# Patient Record
Sex: MALE | Race: WHITE
[De-identification: names, ages, dates, MRNs, and addresses within clinical notes are randomized per-mention and may not be internally consistent; named-entity substitution may affect disease eponyms.]

---

## 2018-07-28 ENCOUNTER — HOSPITAL ENCOUNTER (EMERGENCY)
Dept: HOSPITAL 82 - ED | Age: 38
Discharge: HOME | DRG: 305 | End: 2018-07-28
Payer: COMMERCIAL

## 2018-07-28 VITALS — DIASTOLIC BLOOD PRESSURE: 82 MMHG | SYSTOLIC BLOOD PRESSURE: 148 MMHG

## 2018-07-28 VITALS — HEIGHT: 65 IN | WEIGHT: 149.91 LBS | BODY MASS INDEX: 24.98 KG/M2

## 2018-07-28 DIAGNOSIS — J44.9: ICD-10-CM

## 2018-07-28 DIAGNOSIS — K21.9: ICD-10-CM

## 2018-07-28 DIAGNOSIS — I10: Primary | ICD-10-CM

## 2018-07-28 DIAGNOSIS — F17.210: ICD-10-CM

## 2019-07-23 ENCOUNTER — HOSPITAL ENCOUNTER (EMERGENCY)
Dept: HOSPITAL 82 - ED | Age: 39
Discharge: HOME | DRG: 392 | End: 2019-07-23
Payer: COMMERCIAL

## 2019-07-23 VITALS — BODY MASS INDEX: 27.55 KG/M2 | HEIGHT: 65 IN | WEIGHT: 165.35 LBS

## 2019-07-23 VITALS — DIASTOLIC BLOOD PRESSURE: 79 MMHG | SYSTOLIC BLOOD PRESSURE: 146 MMHG

## 2019-07-23 DIAGNOSIS — K52.9: Primary | ICD-10-CM

## 2019-07-23 DIAGNOSIS — Z87.11: ICD-10-CM

## 2019-07-23 DIAGNOSIS — J44.9: ICD-10-CM

## 2019-07-23 DIAGNOSIS — F17.210: ICD-10-CM

## 2019-07-23 DIAGNOSIS — I10: ICD-10-CM

## 2019-07-23 DIAGNOSIS — K21.9: ICD-10-CM

## 2019-07-23 LAB
ALBUMIN SERPL-MCNC: 5 G/DL (ref 3.2–5)
ALP SERPL-CCNC: 88 U/L (ref 38–126)
ANION GAP SERPL CALCULATED.3IONS-SCNC: 17 MMOL/L
AST SERPL-CCNC: 36 U/L (ref 17–59)
BASOPHILS NFR BLD AUTO: 0 % (ref 0–3)
BILIRUB UR QL STRIP.AUTO: NEGATIVE
BUN SERPL-MCNC: 17 MG/DL (ref 9–20)
BUN/CREAT SERPL: 23
CHLORIDE SERPL-SCNC: 101 MMOL/L (ref 95–108)
CO2 SERPL-SCNC: 28 MMOL/L (ref 22–30)
COLOR UR AUTO: YELLOW
CREAT SERPL-MCNC: 0.8 MG/DL (ref 0.7–1.3)
EOSINOPHIL NFR BLD AUTO: 0 % (ref 0–8)
ERYTHROCYTE [DISTWIDTH] IN BLOOD BY AUTOMATED COUNT: 12 % (ref 11.5–15.5)
GLUCOSE UR STRIP.AUTO-MCNC: NEGATIVE MG/DL
HCT VFR BLD AUTO: 40.2 % (ref 39–50)
HGB BLD-MCNC: 14.1 G/DL (ref 14–18)
HGB UR QL STRIP.AUTO: NEGATIVE
IMM GRANULOCYTES NFR BLD: 0.5 % (ref 0–5)
KETONES UR STRIP.AUTO-MCNC: NEGATIVE MG/DL
LEUKOCYTE ESTERASE UR QL STRIP.AUTO: NEGATIVE
LIPASE SERPL-CCNC: 201 U/L (ref 23–300)
LYMPHOCYTES NFR BLD: 21 % (ref 15–41)
MCH RBC QN AUTO: 34.9 PG  CALC (ref 26–32)
MCHC RBC AUTO-ENTMCNC: 35.1 G/L CALC (ref 32–36)
MCV RBC AUTO: 99.5 FL  CALC (ref 80–100)
MONOCYTES NFR BLD AUTO: 10 % (ref 2–13)
NEUTROPHILS # BLD AUTO: 6.78 THOU/UL (ref 1.82–7.42)
NEUTROPHILS NFR BLD AUTO: 68 % (ref 42–76)
NITRITE UR QL STRIP.AUTO: NEGATIVE
PH UR STRIP.AUTO: 6.5 [PH] (ref 4.5–8)
PLATELET # BLD AUTO: 200 THOU/UL (ref 130–400)
POTASSIUM SERPL-SCNC: 3.6 MMOL/L (ref 3.5–5.1)
PROT SERPL-MCNC: 7.9 G/DL (ref 6.3–8.2)
PROT UR QL STRIP.AUTO: NEGATIVE MG/DL
RBC # BLD AUTO: 4.04 MILL/UL (ref 4.7–6.1)
SODIUM SERPL-SCNC: 142 MMOL/L (ref 137–146)
SP GR UR STRIP.AUTO: <=1.005
UROBILINOGEN UR QL STRIP.AUTO: 0.2 E.U./DL

## 2020-01-04 ENCOUNTER — HOSPITAL ENCOUNTER (INPATIENT)
Dept: HOSPITAL 82 - ED | Age: 40
LOS: 5 days | Discharge: HOME | DRG: 158 | End: 2020-01-09
Attending: INTERNAL MEDICINE | Admitting: INTERNAL MEDICINE
Payer: COMMERCIAL

## 2020-01-04 VITALS — BODY MASS INDEX: 22.42 KG/M2 | WEIGHT: 134.56 LBS | HEIGHT: 65 IN

## 2020-01-04 VITALS — SYSTOLIC BLOOD PRESSURE: 161 MMHG | DIASTOLIC BLOOD PRESSURE: 65 MMHG

## 2020-01-04 VITALS — DIASTOLIC BLOOD PRESSURE: 92 MMHG | SYSTOLIC BLOOD PRESSURE: 161 MMHG

## 2020-01-04 VITALS — DIASTOLIC BLOOD PRESSURE: 86 MMHG | SYSTOLIC BLOOD PRESSURE: 178 MMHG

## 2020-01-04 VITALS — DIASTOLIC BLOOD PRESSURE: 81 MMHG | SYSTOLIC BLOOD PRESSURE: 166 MMHG

## 2020-01-04 VITALS — SYSTOLIC BLOOD PRESSURE: 159 MMHG | DIASTOLIC BLOOD PRESSURE: 93 MMHG

## 2020-01-04 DIAGNOSIS — F10.239: ICD-10-CM

## 2020-01-04 DIAGNOSIS — B00.2: Primary | ICD-10-CM

## 2020-01-04 DIAGNOSIS — K21.9: ICD-10-CM

## 2020-01-04 DIAGNOSIS — E55.9: ICD-10-CM

## 2020-01-04 DIAGNOSIS — Z91.128: ICD-10-CM

## 2020-01-04 DIAGNOSIS — E87.1: ICD-10-CM

## 2020-01-04 DIAGNOSIS — H10.33: ICD-10-CM

## 2020-01-04 DIAGNOSIS — T46.5X6A: ICD-10-CM

## 2020-01-04 DIAGNOSIS — I10: ICD-10-CM

## 2020-01-04 DIAGNOSIS — F43.10: ICD-10-CM

## 2020-01-04 DIAGNOSIS — J44.9: ICD-10-CM

## 2020-01-04 DIAGNOSIS — Z87.11: ICD-10-CM

## 2020-01-04 DIAGNOSIS — E87.2: ICD-10-CM

## 2020-01-04 DIAGNOSIS — F17.210: ICD-10-CM

## 2020-01-04 LAB
ALBUMIN SERPL-MCNC: 5 G/DL (ref 3.2–5)
ALP SERPL-CCNC: 92 U/L (ref 38–126)
ANION GAP SERPL CALCULATED.3IONS-SCNC: 29 MMOL/L
AST SERPL-CCNC: 49 U/L (ref 17–59)
BARBITURATES UR-MCNC: NEGATIVE UG/ML
BASOPHILS NFR BLD AUTO: 0 % (ref 0–3)
BILIRUB UR QL STRIP.AUTO: (no result)
BUN SERPL-MCNC: 17 MG/DL (ref 9–20)
BUN/CREAT SERPL: 25
CHLORIDE SERPL-SCNC: 98 MMOL/L (ref 95–108)
CO2 SERPL-SCNC: 14 MMOL/L (ref 22–30)
COCAINE UR-MCNC: NEGATIVE NG/ML
COLOR UR AUTO: YELLOW
CREAT SERPL-MCNC: 0.7 MG/DL (ref 0.7–1.3)
EOSINOPHIL NFR BLD AUTO: 0 % (ref 0–8)
EPI CELLS URNS QL MICRO: (no result) EPI/HPF
ERYTHROCYTE [DISTWIDTH] IN BLOOD BY AUTOMATED COUNT: 11.6 % (ref 11.5–15.5)
GLUCOSE UR STRIP.AUTO-MCNC: NEGATIVE MG/DL
HCT VFR BLD AUTO: 47.3 % (ref 39–50)
HGB BLD-MCNC: 16.4 G/DL (ref 14–18)
HGB UR QL STRIP.AUTO: (no result)
IMM GRANULOCYTES NFR BLD: 0.3 % (ref 0–5)
KETONES UR STRIP.AUTO-MCNC: >=80 MG/DL
LEUKOCYTE ESTERASE UR QL STRIP.AUTO: NEGATIVE
LYMPHOCYTES NFR BLD: 17 % (ref 15–41)
MCH RBC QN AUTO: 33.7 PG  CALC (ref 26–32)
MCHC RBC AUTO-ENTMCNC: 34.7 G/L CALC (ref 32–36)
MCV RBC AUTO: 97.3 FL  CALC (ref 80–100)
METHADONE SERPL-MCNC: NEGATIVE NG/ML
MONOCYTES NFR BLD AUTO: 13 % (ref 2–13)
MYOGLOBIN SERPL-MCNC: 28 NG/ML (ref 0–121)
NEUTROPHILS # BLD AUTO: 8.25 THOU/UL (ref 1.82–7.42)
NEUTROPHILS NFR BLD AUTO: 69 % (ref 42–76)
NITRITE UR QL STRIP.AUTO: NEGATIVE
OXCYCODONE: NEGATIVE
PH UR STRIP.AUTO: 6 [PH] (ref 4.5–8)
PLATELET # BLD AUTO: 226 THOU/UL (ref 130–400)
POTASSIUM SERPL-SCNC: 4.6 MMOL/L (ref 3.5–5.1)
PROT SERPL-MCNC: 8.4 G/DL (ref 6.3–8.2)
PROT UR QL STRIP.AUTO: 100 MG/DL
RBC # BLD AUTO: 4.86 MILL/UL (ref 4.7–6.1)
RBC #/AREA URNS HPF: (no result) RBC/HPF (ref 0–5)
SERVICE CMNT 15-IMP: (no result) HPF
SODIUM SERPL-SCNC: 136 MMOL/L (ref 137–146)
SP GR UR STRIP.AUTO: >=1.03
TETRAHYDROCANNABIONOL: NEGATIVE
TRICYLIC ANTIDEPRESSANTS: NEGATIVE
UROBILINOGEN UR QL STRIP.AUTO: 1 E.U./DL

## 2020-01-04 NOTE — NUR
male pt received to ICU bed 5 via wc accompanied by MICHELINE Buenrostro RN in stable
condition (med surg tele overflow); ambulatory with steady gait; assessment
completed at this time; pt alert and oriented; c/c of flu like symptoms
starting Mon and sore throat starting yesterday; admits to throat pain; no n/v
noted; resp even and unlabored; lungs clear; skin color wnl; ra; hr reg;
strong pulses; no edema noted; sr on monitor; abd soft with bs present; no bm
noted per writer; pt admits to voiding without difficulty; no urine to inspect
at this time; urinal placed at bedside; #20 flushed and patent to lac; ivf/
abt to be initiated; plan of care/meds explained; oral cavity/ tongue noted
very foul smelling; tongue coated with white substance; fall pre/ no smoking
policy explained; pt denies need for librium; pt admits to drinking 1-3 drinks
daily (beer or rum); call light within reach; will continue to monitor

## 2020-01-04 NOTE — NUR
RESTING COMFORTABLY ON BED WITH CONTINUOUS IVF BOLUS. TOLERATING WELL. UPDATED
ON NEED FOR URINE FOR TESTING. PT ACKNOWLEDGES. VSS.

## 2020-01-04 NOTE — NUR
Admission Note
 
Report Given to:         ICU
Transported by:          X Wheelchair           Stretcher
 
Transported with:        X Nurse     Transporter   X Patent IV    O2
                          Cardiac Monitor
 
Location:                X ICU       MS2
 
 
          
 
         PT TO ROOM 5 IN STABLE CONDITION.

## 2020-01-04 NOTE — NUR
awake in bed; offers no complaints; no apparent distress noted; iv intact and
patent; no redness or edema noted at site; sr on monitor; call light within
reach; will continue to monitor

## 2020-01-04 NOTE — NUR
PT RESTING IN STRETCHER IN NAD, RR 20. SPO2 99% ON ROOM AIR AT THIS TIME. PT
AWARE OF PLAN OF CARE AND WAIT TIME. CALL BELL WITHIN REACH.

## 2020-01-04 NOTE — NUR
awake in bed; visitors x2 present at bedside; no apparent distress noted iv
patent; no redness or edema noted at site; sr on monitor; pt offers no
complaints; call light within reach

## 2020-01-04 NOTE — NUR
awake. no resp diff. cardiac monitor shows sinus rhythm hr 77. #20 lac ns
infusing @ 100cchr. refused po intake. voids per urinal. fall precautions
cont.

## 2020-01-04 NOTE — NUR
PT RESTING IN STRETCHER IN NAD. SPO2 99% ON ROOM AIR. IV FLUIDS INFUSING WITH
NO DIFFICULTY, SITE FREE FROM REDNESS/EDEMA. PT AWARE OF PENDING RESULTS AND
WAIT TIME. CALL BELL WITHIN REACH.

## 2020-01-05 VITALS — DIASTOLIC BLOOD PRESSURE: 81 MMHG | SYSTOLIC BLOOD PRESSURE: 160 MMHG

## 2020-01-05 VITALS — SYSTOLIC BLOOD PRESSURE: 175 MMHG | DIASTOLIC BLOOD PRESSURE: 84 MMHG

## 2020-01-05 VITALS — DIASTOLIC BLOOD PRESSURE: 85 MMHG | SYSTOLIC BLOOD PRESSURE: 167 MMHG

## 2020-01-05 VITALS — SYSTOLIC BLOOD PRESSURE: 157 MMHG | DIASTOLIC BLOOD PRESSURE: 88 MMHG

## 2020-01-05 VITALS — SYSTOLIC BLOOD PRESSURE: 171 MMHG | DIASTOLIC BLOOD PRESSURE: 85 MMHG

## 2020-01-05 VITALS — DIASTOLIC BLOOD PRESSURE: 85 MMHG | SYSTOLIC BLOOD PRESSURE: 171 MMHG

## 2020-01-05 LAB
ANION GAP SERPL CALCULATED.3IONS-SCNC: 16 MMOL/L
BUN SERPL-MCNC: 9 MG/DL (ref 9–20)
BUN/CREAT SERPL: 18
CHLORIDE SERPL-SCNC: 97 MMOL/L (ref 95–108)
CO2 SERPL-SCNC: 22 MMOL/L (ref 22–30)
CREAT SERPL-MCNC: 0.5 MG/DL (ref 0.7–1.3)
ERYTHROCYTE [DISTWIDTH] IN BLOOD BY AUTOMATED COUNT: 11.4 % (ref 11.5–15.5)
HCT VFR BLD AUTO: 40.5 % (ref 39–50)
HGB BLD-MCNC: 14.3 G/DL (ref 14–18)
MCH RBC QN AUTO: 34.2 PG  CALC (ref 26–32)
MCHC RBC AUTO-ENTMCNC: 35.3 G/L CALC (ref 32–36)
MCV RBC AUTO: 96.9 FL  CALC (ref 80–100)
PLATELET # BLD AUTO: 182 THOU/UL (ref 130–400)
POTASSIUM SERPL-SCNC: 4.3 MMOL/L (ref 3.5–5.1)
RBC # BLD AUTO: 4.18 MILL/UL (ref 4.7–6.1)
SODIUM SERPL-SCNC: 131 MMOL/L (ref 137–146)

## 2020-01-05 NOTE — NUR
PT IS RELAXING IN BED WITH NO DISTRESS NOTED.IV SITE IS FREE FROM REDNESS OR
EDEMA. CONTINUE TO OBSERVE AND MONITOR.

## 2020-01-05 NOTE — NUR
PATIENT ALERT ORIENTED, ABLE TO MAKE NEEEDS KNONW, WITH AN ONGOING IV OF NS @
125CC/HR INFUSING WELL ON LAC, REMAINS ON TELE SR 73, STILL HAVE SORETHROAT
REFUSED PAIN MEDICATION, PAIN TOLERABLE, CALL LIGHT AT REACH.

## 2020-01-05 NOTE — NUR
ASSESSMENT IS COMPLETED: IV SITE IS FREE FROM REDNESS OR EDEMA. HR IS
REG,PULSES ARE STRONG X4, ABD IS SOFT WITH ACTIVE BS. BREATH SOUNDS ARE CLEAR,
BILATERALLY. NO C/O SOB. CONTINUE TO OBSERVE AND MONITOR.

## 2020-01-05 NOTE — NUR
RECEIVED REPORT FROM DAY NURSE, PATIENT RESTING IN BED, WATCHING TV, EVEN
UNLABORED BREATHING CALL LIGHT AT REACH.

## 2020-01-06 VITALS — DIASTOLIC BLOOD PRESSURE: 87 MMHG | SYSTOLIC BLOOD PRESSURE: 184 MMHG

## 2020-01-06 VITALS — DIASTOLIC BLOOD PRESSURE: 71 MMHG | SYSTOLIC BLOOD PRESSURE: 168 MMHG

## 2020-01-06 VITALS — DIASTOLIC BLOOD PRESSURE: 80 MMHG | SYSTOLIC BLOOD PRESSURE: 138 MMHG

## 2020-01-06 VITALS — SYSTOLIC BLOOD PRESSURE: 141 MMHG | DIASTOLIC BLOOD PRESSURE: 81 MMHG

## 2020-01-06 VITALS — SYSTOLIC BLOOD PRESSURE: 166 MMHG | DIASTOLIC BLOOD PRESSURE: 85 MMHG

## 2020-01-06 VITALS — DIASTOLIC BLOOD PRESSURE: 91 MMHG | SYSTOLIC BLOOD PRESSURE: 172 MMHG

## 2020-01-06 VITALS — SYSTOLIC BLOOD PRESSURE: 160 MMHG | DIASTOLIC BLOOD PRESSURE: 90 MMHG

## 2020-01-06 VITALS — SYSTOLIC BLOOD PRESSURE: 182 MMHG | DIASTOLIC BLOOD PRESSURE: 92 MMHG

## 2020-01-06 VITALS — SYSTOLIC BLOOD PRESSURE: 178 MMHG | DIASTOLIC BLOOD PRESSURE: 86 MMHG

## 2020-01-06 VITALS — DIASTOLIC BLOOD PRESSURE: 72 MMHG | SYSTOLIC BLOOD PRESSURE: 136 MMHG

## 2020-01-06 VITALS — SYSTOLIC BLOOD PRESSURE: 194 MMHG | DIASTOLIC BLOOD PRESSURE: 91 MMHG

## 2020-01-06 NOTE — NUR
PT OOB RESTING IN RECLINER,A&O X3;VS OBTAINED AND ASSESSMENT
COMPLETED,CURRENT MALI 184/87 HR 92 ALL MORNING MEDICATIONS ADMINISTERED AT
THIS TIME;PT REPORTS MOUTH PAIN RATING 5/10 ON THE PAIN SCALE AND REQUESTS
PAIN MEDICATION.PT TO BE MEDICATED WITH PRN TYLENOL 650MG PO;RESPIRATIONS EVEN
AND UNLABORED ON RA;ABDOMEN SOFT ON PALPATION AND ACTIVE IN ALL 4
QUADRANTS;STRONG PEDAL PULSES;SKIN INTACT;TELE MONITORING IN PLACE;#20G TO LAC
INFUSING NS @100ML/HR,SITE APPEARS HEALTHY;PY DENIES ANY ADDITIONAL NEEDS AND
IS ENCOURAGED TO CALL FOR ASSISTANCE IF NEEDED;FALL PRECAUTIONS IN PLACE WITH
CALL LIGHT IN REACH;WILL CONTINUE TO MONITOR

## 2020-01-06 NOTE — NUR
PT TRANSPORTED TO [facility] IN STABLE CONDITION VIA WHEELCHAIR ACCOMPANIED BY
VOLUNTEER.

## 2020-01-06 NOTE — NUR
RECEIVED REPORT FROM NURSE ENCINAS PATIENT APPEARS TO BE SLEEPING WITH EYES
CLOSED, EVEN UNLABORED BREATHING CALL LIGHT AT REACH.

## 2020-01-06 NOTE — NUR
REPORT RECEIVED FROM AURA VILLALTA;PT APPEARS TO BE SLEEPING IN SEMI FOWLERS
POSITION;NO S/S OF DISTRESS NOTED;RESPIRATIONS EVEN AND UNLABORED ON RA;TELE
MONITORING IN PLACE;IV FLUIDS INFUSING WITH EASE PER ORDER;FALL PRECAUTIONS
NOTED WITH CALL LIGHT IN REACH;WILL CONTINUE TO MONITOR

## 2020-01-06 NOTE — NUR
PT RESTING IN RECLINER;RESPIRATIONS EVEN AND UNLABORED ON RA;PT DENIES ANY
CURRENT NEEDS AT THIS TIME;TELE MONITORING IN PLACE;IV FLUIDS INFUSING WITH
EASE;ASSESSMENT REMAINS UNCHANGED;ENCOURAGED PT TO CALL FOR ASSISTANE IF
NEEDED;CALL LIGHT IN REACH;WILL CONTINUE TO MONITOR

## 2020-01-06 NOTE — NUR
PATIENT AWAKE AT THIS TIME, A/O ABLE TO MAKE NEEDS RAYNE MACK AN ONGOING IV
OF NS @ 100CC/HR INFUSING WELL ON LAC, REMAINS ON TELE SR 83, PAIN ON MOUTH
SORE STATED 3/10 , WILL MEDICATE FOR PAIN.

## 2020-01-06 NOTE — NUR
PT RESTING IN RECLINER;RESPIRATIONS EVEN AND UNLABORED ON RA;BP CURRENTLY
172/91 HR 84, PRN APRESOLINE 10MG IVP TO BE ADMINISTERED BY AURA VARNER;IV
FLUIDS INFUSING WITH EASE PER ORDER;TELE MONITORING IN PLACE;PRN LIBRIUM 25MG
PO ADMINISTERED AT THIS TIME;PT DENIES ANY ADDITIONAL NEEDS AND IS ENCOURAGED
TO CALL FOR ASSISTANCE IF NEEDED;CALL LIGHT IN REACH;WILL CONTINUE TO MONITOR

## 2020-01-07 VITALS — DIASTOLIC BLOOD PRESSURE: 93 MMHG | SYSTOLIC BLOOD PRESSURE: 169 MMHG

## 2020-01-07 VITALS — SYSTOLIC BLOOD PRESSURE: 158 MMHG | DIASTOLIC BLOOD PRESSURE: 82 MMHG

## 2020-01-07 VITALS — SYSTOLIC BLOOD PRESSURE: 142 MMHG | DIASTOLIC BLOOD PRESSURE: 63 MMHG

## 2020-01-07 VITALS — SYSTOLIC BLOOD PRESSURE: 162 MMHG | DIASTOLIC BLOOD PRESSURE: 89 MMHG

## 2020-01-07 VITALS — DIASTOLIC BLOOD PRESSURE: 59 MMHG | SYSTOLIC BLOOD PRESSURE: 137 MMHG

## 2020-01-07 VITALS — SYSTOLIC BLOOD PRESSURE: 176 MMHG | DIASTOLIC BLOOD PRESSURE: 93 MMHG

## 2020-01-07 VITALS — SYSTOLIC BLOOD PRESSURE: 151 MMHG | DIASTOLIC BLOOD PRESSURE: 88 MMHG

## 2020-01-07 LAB
ALBUMIN SERPL-MCNC: 3.1 G/DL (ref 3.2–5)
ALP SERPL-CCNC: 60 U/L (ref 38–126)
ANION GAP SERPL CALCULATED.3IONS-SCNC: 11 MMOL/L
AST SERPL-CCNC: 28 U/L (ref 17–59)
BASOPHILS NFR BLD AUTO: 0 % (ref 0–3)
BILIRUB DIRECT SERPL-MCNC: 0 MG/DL (ref 0–0.3)
BUN SERPL-MCNC: 5 MG/DL (ref 9–20)
BUN/CREAT SERPL: 10
CHLORIDE SERPL-SCNC: 103 MMOL/L (ref 95–108)
CO2 SERPL-SCNC: 24 MMOL/L (ref 22–30)
CREAT SERPL-MCNC: 0.5 MG/DL (ref 0.7–1.3)
EOSINOPHIL NFR BLD AUTO: 1 % (ref 0–8)
ERYTHROCYTE [DISTWIDTH] IN BLOOD BY AUTOMATED COUNT: 11.4 % (ref 11.5–15.5)
HCT VFR BLD AUTO: 38.2 % (ref 39–50)
HGB BLD-MCNC: 13.5 G/DL (ref 14–18)
IMM GRANULOCYTES NFR BLD: 0.6 % (ref 0–5)
LYMPHOCYTES NFR BLD: 35 % (ref 15–41)
MAGNESIUM SERPL-MCNC: 1.6 MG/DL (ref 1.6–2.3)
MCH RBC QN AUTO: 34.1 PG  CALC (ref 26–32)
MCHC RBC AUTO-ENTMCNC: 35.3 G/L CALC (ref 32–36)
MCV RBC AUTO: 96.5 FL  CALC (ref 80–100)
MONOCYTES NFR BLD AUTO: 15 % (ref 2–13)
NEUTROPHILS # BLD AUTO: 2.52 THOU/UL (ref 1.82–7.42)
NEUTROPHILS NFR BLD AUTO: 48 % (ref 42–76)
PLATELET # BLD AUTO: 169 THOU/UL (ref 130–400)
POTASSIUM SERPL-SCNC: 3.5 MMOL/L (ref 3.5–5.1)
PROT SERPL-MCNC: 5.7 G/DL (ref 6.3–8.2)
RBC # BLD AUTO: 3.96 MILL/UL (ref 4.7–6.1)
SODIUM SERPL-SCNC: 135 MMOL/L (ref 137–146)

## 2020-01-07 NOTE — NUR
PT RESTING IN RECLINER,A&O X3;VS OBTAINED AND ASSESSMENT COMPLETED;PT DENIES
ANY CURRENT PAIN,PAIN SCALE AND REPORTING EDUCATED;RESPIRATIONS EVEN AND
UNLABORED ON RA,CLEAR LUNG SOUNDS;ABDOMEN SOFT ON PALPATION AND ACTIVE IN ALL
4 QUADRANTS;STRONG PEDAL PULSES;SKIN INTACT;THRUSH NOTED TO MOUTH;#20G TO LAC
INFUSING NS @ 100ML/HR,SITE APPEARS HEALTHY BUT DRESSING CHANGED AT THIS
TIME;TELE MONITORING IN PLACE;PT DENIES ANY ADDITIONAL NEEDS AND IS ENCOURAGED
TO CALL FOR ASSISTANCE IF NEEDED;CALL LIGHT IN REACH;WILL CONTINUE TO MONITOR

## 2020-01-07 NOTE — NUR
ASSESSMENT COMPLETD. IV SITE PATENT AND INFUSING ORDERED IBF WELL. DENIES
NEEDS/PAIN. DECLINES LAXATIVE TO ASSIST WITH BM AND DOES HAVE ACTIVE BS.
UPDATED ON POC. ENCOURAGED TO CALL FOR ANY NEEDS. INSTRUCTED TO USE URINAL FOR
ALL VOIDS. CALL LIGHT IS IN REACH.

## 2020-01-07 NOTE — NUR
PT OOB RESTING IN RECLINER;RESPIRATIONS EVEN AND UNLABORED ON RA;PT DENIES ANY
CURRENT PAIN OR NEEDS;TELE MONITORING IN PLACE;IV FLUIDS CONTINUE AT
100ML/HR;PT DENIES ANY ADDITIONAL NEEDS AND IS ENCOURAGED TO CALL FOR
ASSISTANCE IF NEEDED;CALL LIGHT IN REACH;WILL CONTINUE TO MONITOR

## 2020-01-07 NOTE — NUR
PT RESTING IN RECLINER;RESPIRATIONS EVEN AND UNLABORED ON RA;PT DENIES ANY
CURRENT NEEDS;IV FLUIDS INFUSING WITH EASE;BP RE-CHECK 162/89 HR 84, PT TO BE
MEDICATED WITH PRN APRESOLINE 10MG IVP BY AURA SHIELDS;TELE MONITORING IN
PLACE;PT DENIES ANY ADDITIONAL NEEDS AND IS ENCOURAGED TO CALL FOR ASSISTANCE
IF NEEDED;CALL LIGHT IN REACH;WILL CONTINUE TO MONITOR

## 2020-01-07 NOTE — NUR
SCHED MEDS GIVEN ALONG WITH PRN RESTORIL AS PER ORDER AND REQUEST TO ASSIST
WITH SLEEP. DENIES FURTHER NEEDS. CALL LIGHT IS IN REACH.

## 2020-01-07 NOTE — NUR
REPORT RECEIVED FROM AURA VILLALTA;PT RESTING IN SEMI FOWLERS POSITION;INTRODUCED
SELF TO PT AND POC DISCUSSED;RESPIRATIONS EVEN AND UNLABORED ON RA;PT DENIES
ANY CURRENT NEEDS;TELE MONITORING IN PLACE;IV FLUIDS INFUSING WITH
EASE;ENCOURAGED TO CALL FOR ASSISTANCE IF NEEDED;FALL PRECAUTIONS IN PLACE
WITH CALL LIGHT IN REACH;WILL CONTINUE TO MONITOR

## 2020-01-08 VITALS — DIASTOLIC BLOOD PRESSURE: 74 MMHG | SYSTOLIC BLOOD PRESSURE: 134 MMHG

## 2020-01-08 VITALS — SYSTOLIC BLOOD PRESSURE: 132 MMHG | DIASTOLIC BLOOD PRESSURE: 85 MMHG

## 2020-01-08 VITALS — SYSTOLIC BLOOD PRESSURE: 140 MMHG | DIASTOLIC BLOOD PRESSURE: 84 MMHG

## 2020-01-08 VITALS — SYSTOLIC BLOOD PRESSURE: 145 MMHG | DIASTOLIC BLOOD PRESSURE: 80 MMHG

## 2020-01-08 VITALS — SYSTOLIC BLOOD PRESSURE: 152 MMHG | DIASTOLIC BLOOD PRESSURE: 74 MMHG

## 2020-01-08 VITALS — DIASTOLIC BLOOD PRESSURE: 86 MMHG | SYSTOLIC BLOOD PRESSURE: 137 MMHG

## 2020-01-08 VITALS — SYSTOLIC BLOOD PRESSURE: 156 MMHG | DIASTOLIC BLOOD PRESSURE: 94 MMHG

## 2020-01-08 VITALS — DIASTOLIC BLOOD PRESSURE: 84 MMHG | SYSTOLIC BLOOD PRESSURE: 142 MMHG

## 2020-01-08 LAB
ANION GAP SERPL CALCULATED.3IONS-SCNC: 10 MMOL/L
BUN SERPL-MCNC: 7 MG/DL (ref 9–20)
BUN/CREAT SERPL: 13
CHLORIDE SERPL-SCNC: 104 MMOL/L (ref 95–108)
CO2 SERPL-SCNC: 24 MMOL/L (ref 22–30)
CREAT SERPL-MCNC: 0.5 MG/DL (ref 0.7–1.3)
ERYTHROCYTE [DISTWIDTH] IN BLOOD BY AUTOMATED COUNT: 11.4 % (ref 11.5–15.5)
HCT VFR BLD AUTO: 35.8 % (ref 39–50)
HGB BLD-MCNC: 12.6 G/DL (ref 14–18)
MAGNESIUM SERPL-MCNC: 1.5 MG/DL (ref 1.6–2.3)
MCH RBC QN AUTO: 34.2 PG  CALC (ref 26–32)
MCHC RBC AUTO-ENTMCNC: 35.2 G/L CALC (ref 32–36)
MCV RBC AUTO: 97.3 FL  CALC (ref 80–100)
PLATELET # BLD AUTO: 184 THOU/UL (ref 130–400)
POTASSIUM SERPL-SCNC: 3.6 MMOL/L (ref 3.5–5.1)
RBC # BLD AUTO: 3.68 MILL/UL (ref 4.7–6.1)
SODIUM SERPL-SCNC: 135 MMOL/L (ref 137–146)

## 2020-01-08 NOTE — NUR
PT OOB RESTING IN RECLINER,A&O X3;VS OBTAINED AND ASSESSMENT COMPLETED;PT
DENIES ANY CURRENT PAIN OR NEEDS,PAIN SCALE AND REPORTING
EDUCATED;RESPIRATIONS EVEN AND UNLABORED ON RA,CLEAR LUNG SOUNDS;ABDOMEN SOFT
ON PALPATION AND ACTIVE IN ALL 4 QUADRANTS;MOM OFFERED BUT PT REFUSES AT THIS
TIME;STRONG PEDAL PULSES;SKIN INTACT;TELE MONITORING IN PLACE;#22G TO RAC
INFUSING NS @ 100ML/HR,SITE APPEARS HEALTHY;NICOTINE PATCH APPLIED TO RIGHT
ARM;PT DENIES ANY ADDITIONAL NEEDS AND IS ENCOURAGED TO CALL FOR ASSISTANCE IF
NEEDED;CALL LIGHT IN REACH;WILL CONTINUE TO MONITOR

## 2020-01-08 NOTE — NUR
PT OOB RESTING IN RECLINER;RESPIRATIONS REMAIN EVEN AND UNLABORED ON RA;PT
DENIES ANY CURRENT PAIN OR NEEDS;TELE MONITORING IN PLACE;IV FLUIDS INFUSING
WITH EASE PER ORDER;ASSESSMENT REMAINS UNCHANGED;ENCOURAGED TO CALL FOR
ASSISTANCE IF NEEDED;CALL LIGHT IN REACH;WILL CONTINUE TO MONITOR

## 2020-01-08 NOTE — NUR
REPORT RECEIVED FROM SHEBARN;PT OOB RESTING IN RECLINER;INTRODUCED SELF TO PT
AND POC DISCUSSED;RESPIRATIONS EVEN AND UNLABORED ON RA;PT DENIES ANY CURRENT
PAIN OR NEEDS;TELE MONITORING IN PLACE;IV SITE PATENT INFUSING NS @
100ML/HR;PT DENIES ANY CURRENT NEEDS AND IS ENCOURAGED TO CALL FOR ASSISTANCE
IF NEEDED;CALL LIGHT IN REACH;WILL CONTINUE TO MONITOR

## 2020-01-08 NOTE — NUR
RESTING IN BED WITH EYES CLOSED; AROUSES EASILY. NO DISTRESS NOTED. CALL LIGHT
IS IN REACH. WILL CONTINUE TO MONITOR.

## 2020-01-08 NOTE — NUR
medicated with ordered prn apresoline for htn; will reassess. pt. denies
needs/pain. call light is in reach.

## 2020-01-08 NOTE — NUR
PT OOB RESTING IN RECLINER;RESPIRATIONS EVEN AND UNLABORED ON RA;PT DENIES ANY
CURRENT PAIN OR NEEDS;TELE MONITORING IN PLACE;IV FLUIDS INFUSING WITH EASE
PER ORDER;ASSESSMENT REMAINS UNCHANGED AT THIS TIME;ENCOURAGED TO CALL FOR
ASSISTANCE IF NEEDED;CALL LIGHT IN REACH;WILL CONTINUE TO MONITOR

## 2020-01-08 NOTE — NUR
PT MEDICATED AS ORDERS PROVIDE AND ASSESSMENT COMPLETED. IV ANTIBIOITIC
THERAPY RUNNING AT THIS TIME. PT DENIES ANY OTHER NEEDS. CALL LIGHT AT BEDSIDE
AND PT ENCOURAGED TO CALL.

## 2020-01-09 VITALS — SYSTOLIC BLOOD PRESSURE: 142 MMHG | DIASTOLIC BLOOD PRESSURE: 91 MMHG

## 2020-01-09 VITALS — DIASTOLIC BLOOD PRESSURE: 93 MMHG | SYSTOLIC BLOOD PRESSURE: 158 MMHG

## 2020-01-09 VITALS — DIASTOLIC BLOOD PRESSURE: 87 MMHG | SYSTOLIC BLOOD PRESSURE: 138 MMHG

## 2020-01-09 LAB
ANION GAP SERPL CALCULATED.3IONS-SCNC: 10 MMOL/L
BUN SERPL-MCNC: 7 MG/DL (ref 9–20)
BUN/CREAT SERPL: 13
CHLORIDE SERPL-SCNC: 105 MMOL/L (ref 95–108)
CO2 SERPL-SCNC: 26 MMOL/L (ref 22–30)
CREAT SERPL-MCNC: 0.5 MG/DL (ref 0.7–1.3)
ERYTHROCYTE [DISTWIDTH] IN BLOOD BY AUTOMATED COUNT: 11.6 % (ref 11.5–15.5)
HCT VFR BLD AUTO: 37.5 % (ref 39–50)
HGB BLD-MCNC: 13 G/DL (ref 14–18)
MAGNESIUM SERPL-MCNC: 2 MG/DL (ref 1.6–2.3)
MCH RBC QN AUTO: 33.8 PG  CALC (ref 26–32)
MCHC RBC AUTO-ENTMCNC: 34.7 G/L CALC (ref 32–36)
MCV RBC AUTO: 97.4 FL  CALC (ref 80–100)
PLATELET # BLD AUTO: 198 THOU/UL (ref 130–400)
POTASSIUM SERPL-SCNC: 3.6 MMOL/L (ref 3.5–5.1)
RBC # BLD AUTO: 3.85 MILL/UL (ref 4.7–6.1)
SODIUM SERPL-SCNC: 137 MMOL/L (ref 137–146)

## 2021-02-26 ENCOUNTER — HOSPITAL ENCOUNTER (EMERGENCY)
Dept: HOSPITAL 82 - ED | Age: 41
Discharge: HOME | DRG: 305 | End: 2021-02-26
Payer: COMMERCIAL

## 2021-02-26 VITALS — HEIGHT: 65 IN | WEIGHT: 143.3 LBS | BODY MASS INDEX: 23.88 KG/M2

## 2021-02-26 VITALS — SYSTOLIC BLOOD PRESSURE: 150 MMHG | DIASTOLIC BLOOD PRESSURE: 79 MMHG

## 2021-02-26 DIAGNOSIS — Z87.11: ICD-10-CM

## 2021-02-26 DIAGNOSIS — F17.200: ICD-10-CM

## 2021-02-26 DIAGNOSIS — R73.9: ICD-10-CM

## 2021-02-26 DIAGNOSIS — Z91.128: ICD-10-CM

## 2021-02-26 DIAGNOSIS — R42: ICD-10-CM

## 2021-02-26 DIAGNOSIS — T46.5X6A: ICD-10-CM

## 2021-02-26 DIAGNOSIS — K21.9: ICD-10-CM

## 2021-02-26 DIAGNOSIS — I10: Primary | ICD-10-CM

## 2021-02-26 DIAGNOSIS — J44.9: ICD-10-CM

## 2021-02-26 LAB
ALBUMIN SERPL-MCNC: 5 G/DL (ref 3.2–5)
ALP SERPL-CCNC: 82 U/L (ref 38–126)
ANION GAP SERPL CALCULATED.3IONS-SCNC: 18 MMOL/L
AST SERPL-CCNC: 40 U/L (ref 17–59)
BASOPHILS NFR BLD AUTO: 0 % (ref 0–3)
BILIRUB UR QL STRIP.AUTO: NEGATIVE
BUN SERPL-MCNC: 14 MG/DL (ref 9–20)
BUN/CREAT SERPL: 17
CHLORIDE SERPL-SCNC: 96 MMOL/L (ref 95–108)
CO2 SERPL-SCNC: 23 MMOL/L (ref 22–30)
COLOR UR AUTO: YELLOW
CREAT SERPL-MCNC: 0.8 MG/DL (ref 0.7–1.3)
EOSINOPHIL NFR BLD AUTO: 0 % (ref 0–8)
ERYTHROCYTE [DISTWIDTH] IN BLOOD BY AUTOMATED COUNT: 11.6 % (ref 11.5–15.5)
GLUCOSE UR STRIP.AUTO-MCNC: >=1000 MG/DL
HCT VFR BLD AUTO: 43.6 % (ref 39–50)
HGB BLD-MCNC: 15.2 G/DL (ref 14–18)
HGB UR QL STRIP.AUTO: NEGATIVE
IMM GRANULOCYTES NFR BLD: 0.5 % (ref 0–5)
INR PPP: 0.9 RATIO (ref 0.7–1.3)
KETONES UR STRIP.AUTO-MCNC: NEGATIVE MG/DL
LEUKOCYTE ESTERASE UR QL STRIP.AUTO: NEGATIVE
LYMPHOCYTES NFR BLD: 10 % (ref 15–41)
MAGNESIUM SERPL-MCNC: 1.4 MG/DL (ref 1.6–2.3)
MCH RBC QN AUTO: 33.6 PG  CALC (ref 26–32)
MCHC RBC AUTO-ENTMCNC: 34.9 G/DL CAL (ref 32–36)
MCV RBC AUTO: 96.2 FL  CALC (ref 80–100)
MONOCYTES NFR BLD AUTO: 4 % (ref 2–13)
MYOGLOBIN SERPL-MCNC: 33 NG/ML (ref 0–121)
NEUTROPHILS # BLD AUTO: 9.97 THOU/UL (ref 1.82–7.42)
NEUTROPHILS NFR BLD AUTO: 85 % (ref 42–76)
NITRITE UR QL STRIP.AUTO: NEGATIVE
PH UR STRIP.AUTO: 6 [PH] (ref 4.5–8)
PLATELET # BLD AUTO: 205 THOU/UL (ref 130–400)
POTASSIUM SERPL-SCNC: 3.7 MMOL/L (ref 3.5–5.1)
PROT SERPL-MCNC: 7.7 G/DL (ref 6.3–8.2)
PROT UR QL STRIP.AUTO: 30 MG/DL
PROTHROMBIN TIME: 9.6 SECONDS (ref 9–12.5)
RBC # BLD AUTO: 4.53 MILL/UL (ref 4.7–6.1)
RBC #/AREA URNS HPF: (no result) RBC/HPF (ref 0–5)
SODIUM SERPL-SCNC: 134 MMOL/L (ref 137–146)
SP GR UR STRIP.AUTO: 1.02
TSH SERPL DL<=0.05 MIU/L-ACNC: 0.65 UIU/ML (ref 0.47–4.68)
UROBILINOGEN UR QL STRIP.AUTO: 0.2 E.U./DL
WBC #/AREA URNS HPF: (no result) WBC/HPF (ref 0–5)

## 2021-04-27 ENCOUNTER — HOSPITAL ENCOUNTER (OUTPATIENT)
Dept: HOSPITAL 82 - ED | Age: 41
Setting detail: OBSERVATION
LOS: 1 days | Discharge: LEFT BEFORE DISCHARGE | DRG: 439 | End: 2021-04-28
Attending: INTERNAL MEDICINE | Admitting: INTERNAL MEDICINE
Payer: COMMERCIAL

## 2021-04-27 VITALS — DIASTOLIC BLOOD PRESSURE: 66 MMHG | SYSTOLIC BLOOD PRESSURE: 151 MMHG

## 2021-04-27 VITALS — SYSTOLIC BLOOD PRESSURE: 142 MMHG | DIASTOLIC BLOOD PRESSURE: 83 MMHG

## 2021-04-27 DIAGNOSIS — Z87.11: ICD-10-CM

## 2021-04-27 DIAGNOSIS — I10: ICD-10-CM

## 2021-04-27 DIAGNOSIS — E87.1: ICD-10-CM

## 2021-04-27 DIAGNOSIS — F10.10: ICD-10-CM

## 2021-04-27 DIAGNOSIS — K21.9: ICD-10-CM

## 2021-04-27 DIAGNOSIS — R74.8: ICD-10-CM

## 2021-04-27 DIAGNOSIS — K85.20: Primary | ICD-10-CM

## 2021-04-27 DIAGNOSIS — Z20.822: ICD-10-CM

## 2021-04-27 DIAGNOSIS — J44.9: ICD-10-CM

## 2021-04-27 DIAGNOSIS — F17.200: ICD-10-CM

## 2021-04-27 LAB
ALBUMIN SERPL-MCNC: 5.9 G/DL (ref 3.2–5)
ALP SERPL-CCNC: 122 U/L (ref 38–126)
ANION GAP SERPL CALCULATED.3IONS-SCNC: 20 MMOL/L
AST SERPL-CCNC: 277 U/L (ref 17–59)
BASOPHILS NFR BLD AUTO: 0 % (ref 0–3)
BUN SERPL-MCNC: 23 MG/DL (ref 9–20)
BUN/CREAT SERPL: 26
CHLORIDE SERPL-SCNC: 86 MMOL/L (ref 95–108)
CO2 SERPL-SCNC: 28 MMOL/L (ref 22–30)
CREAT SERPL-MCNC: 0.9 MG/DL (ref 0.7–1.3)
EOSINOPHIL NFR BLD AUTO: 0 % (ref 0–8)
ERYTHROCYTE [DISTWIDTH] IN BLOOD BY AUTOMATED COUNT: 12.6 % (ref 11.5–15.5)
HCT VFR BLD AUTO: 43.7 % (ref 39–50)
HGB BLD-MCNC: 14.9 G/DL (ref 14–18)
IMM GRANULOCYTES NFR BLD: 0.5 % (ref 0–5)
LIPASE SERPL-CCNC: 1676 U/L (ref 23–300)
LYMPHOCYTES NFR BLD: 12 % (ref 15–41)
MCH RBC QN AUTO: 34.1 PG  CALC (ref 26–32)
MCHC RBC AUTO-ENTMCNC: 34.1 G/DL CAL (ref 32–36)
MCV RBC AUTO: 100 FL  CALC (ref 80–100)
MONOCYTES NFR BLD AUTO: 6 % (ref 2–13)
NEUTROPHILS # BLD AUTO: 8.85 THOU/UL (ref 1.82–7.42)
NEUTROPHILS NFR BLD AUTO: 81 % (ref 42–76)
PLATELET # BLD AUTO: 75 THOU/UL (ref 130–400)
POTASSIUM SERPL-SCNC: 4.8 MMOL/L (ref 3.5–5.1)
PROT SERPL-MCNC: 9.2 G/DL (ref 6.3–8.2)
RBC # BLD AUTO: 4.37 MILL/UL (ref 4.7–6.1)
SODIUM SERPL-SCNC: 129 MMOL/L (ref 137–146)

## 2021-04-27 PROCEDURE — G0378 HOSPITAL OBSERVATION PER HR: HCPCS

## 2021-04-27 NOTE — NUR
PT ARRIVED VIA WC IN STABLE CONDITION TO MED SURG FLOOR ACCOMPANIED BY ED
NURSE CHENTE;PT AMBULATED TO BED WITH NO ASSISTANCE NEEDED;ARM BANDS PLACED
ON PT;VS AND ASSESSMENT COMPLETED;PT ORIENTED TO ROOM AND CALL BELL
SYSTEM;HOME MEDICATIONS OBTAINED FROM PT TO BE SENT TO PHARMACY;INVENTORY
TAKEN OF PT BELONGINGS;NPO SIGN PLACED ON DOOR;HEART SOUNDS ARE
REGULAR IN RATE AND RHYTHM;LUNG SOUNDS ARE CLEAR;RESPIRATIONS ARE EVEN AND
UNLABORED ON RA;#20G IV IN RAC IS RUNNING NS@150ML/HR;IV SITE IS FREE OF
COMPLICATIONS AT THIS TIME;SAFETY PRECAUTIONS IN PLACE;CALL LIGHT WITHIN
REACH;WILL CONTINUE TO MONITOR.

## 2021-04-27 NOTE — NUR
Reassessment of patient completed.  No distress noted. CALL BELL WITHIN REACH.
DENIES ANY NEEDS AT THIS TIME

## 2021-04-27 NOTE — NUR
PT LAYING SEMIFOWLERS IN BED. PHYSICAL ASSESMENT COMPLETE. PT DENIES ABD PAIN
OR N/V, DENIES DIARHEA OR CONSTIPATION. LAST REPORTED BM 4/27/21. PT REPORT HE
FEELS READY TO ADVANCE TO ORAL INTAKE. PT PROVIDED WITH ICE CHIPS TO ASSESS IF
HE CAN TOLERATE THEM. PRN LIBRIUM ADMINISTERED PER PT'S REQUEST, SEE
E-MAR. CIWA SCORE OF 2 FOR MILD ANXIETY AND MILD VISUAL SENSITIVITY TO LIGHT.
REPORTS LAST ETOH INTAKE 4/24/21. PT APPEARS CALM AND COOPERATIVE. PLAN OF
CARE REVIEW, PT APPEARS ENGAGED. DENIES QUESTIONS AND VERBALIZES
UNDERSTANDING. R-AC 20G IV PATENT AND INFUSING NS @ 150ML/H. PT DENIES NEEDS
AT THIS TIME. CALL BELL WITHIN REACH, AGREES TO CALL PRN.

## 2021-04-27 NOTE — NUR
PT TOLERATING SIPS OF H2O AND ICE CHIPS W/O N/V OR PAIN. WILL ENDORSE TO AM
NURSE AND SUGGEST DIET BE ADVANCED BY PROVIDER.

## 2021-04-28 VITALS — DIASTOLIC BLOOD PRESSURE: 86 MMHG | SYSTOLIC BLOOD PRESSURE: 146 MMHG

## 2021-04-28 VITALS — SYSTOLIC BLOOD PRESSURE: 150 MMHG | DIASTOLIC BLOOD PRESSURE: 89 MMHG

## 2021-04-28 LAB
ALBUMIN SERPL-MCNC: 4.2 G/DL (ref 3.2–5)
ALP SERPL-CCNC: 80 U/L (ref 38–126)
ANION GAP SERPL CALCULATED.3IONS-SCNC: 17 MMOL/L
AST SERPL-CCNC: 157 U/L (ref 17–59)
BASOPHILS NFR BLD AUTO: 0 % (ref 0–3)
BUN SERPL-MCNC: 20 MG/DL (ref 9–20)
BUN/CREAT SERPL: 26
CHLORIDE SERPL-SCNC: 94 MMOL/L (ref 95–108)
CO2 SERPL-SCNC: 22 MMOL/L (ref 22–30)
CREAT SERPL-MCNC: 0.8 MG/DL (ref 0.7–1.3)
EOSINOPHIL NFR BLD AUTO: 0 % (ref 0–8)
ERYTHROCYTE [DISTWIDTH] IN BLOOD BY AUTOMATED COUNT: 12.6 % (ref 11.5–15.5)
HCT VFR BLD AUTO: 35.4 % (ref 39–50)
HGB BLD-MCNC: 11.9 G/DL (ref 14–18)
IMM GRANULOCYTES NFR BLD: 0.4 % (ref 0–5)
LIPASE SERPL-CCNC: 1184 U/L (ref 23–300)
LYMPHOCYTES NFR BLD: 23 % (ref 15–41)
MCH RBC QN AUTO: 33.8 PG  CALC (ref 26–32)
MCHC RBC AUTO-ENTMCNC: 33.6 G/DL CAL (ref 32–36)
MCV RBC AUTO: 100.6 FL  CALC (ref 80–100)
MONOCYTES NFR BLD AUTO: 10 % (ref 2–13)
NEUTROPHILS # BLD AUTO: 4.39 THOU/UL (ref 1.82–7.42)
NEUTROPHILS NFR BLD AUTO: 66 % (ref 42–76)
PLATELET # BLD AUTO: 45 THOU/UL (ref 130–400)
POTASSIUM SERPL-SCNC: 4.1 MMOL/L (ref 3.5–5.1)
PROT SERPL-MCNC: 6.6 G/DL (ref 6.3–8.2)
RBC # BLD AUTO: 3.52 MILL/UL (ref 4.7–6.1)
SODIUM SERPL-SCNC: 129 MMOL/L (ref 137–146)

## 2021-04-28 NOTE — NUR
UPON GOING INTO ROOM TO ASSESS PATIENT PATIENT STATED THAT HE WANTED TO SIGN
OUT AMA AT THIS TIME. PATIENT STATED HE HAS A LOT OF PERSONAL THINGS TO DO AT
HOME AND NEEDS TO LEAVE NOW. PATIENT STATED "I'M GOING TO CONTINUE DRINKING
WHEN I WANT BECAUSE I LIKE IT, SHLOMO MARTINEZ NOTIFIED A THIS TIME PATINET
GIVEN EDUCATION ON ALCOHOL ABUSE AND EXPLAINED RISK AND BENEFITS OF LEAVING.

## 2021-04-28 NOTE — NUR
PT REPORTS TO BE USING CHEWING TOBACCO, ADVISED PT THIS IS UNSAFE WITH
NICOTINE PATCH ON AND HE SHOULD NOT BE CONSUMING MULTIPLE NICOTINE PRODUCTS.
PT VERBALIZES UNDERSTANDING.

## 2021-04-28 NOTE — NUR
PT REPORTS HE FEELS READY TO ADVANCE TO PO INTAKE AND READY TO BE DISCHARGED,
WILL INFORM ONCOMING SHIFT. PT ENCOURAGED TO VERBALIZE REQUESTS TO PROVIDER
THIS AM.

## 2021-05-24 ENCOUNTER — HOSPITAL ENCOUNTER (INPATIENT)
Dept: HOSPITAL 82 - ED | Age: 41
LOS: 3 days | Discharge: HOME | DRG: 897 | End: 2021-05-27
Attending: INTERNAL MEDICINE | Admitting: INTERNAL MEDICINE
Payer: COMMERCIAL

## 2021-05-24 VITALS — SYSTOLIC BLOOD PRESSURE: 175 MMHG | DIASTOLIC BLOOD PRESSURE: 76 MMHG

## 2021-05-24 VITALS — WEIGHT: 156.53 LBS | HEIGHT: 65 IN | BODY MASS INDEX: 26.08 KG/M2

## 2021-05-24 VITALS — SYSTOLIC BLOOD PRESSURE: 153 MMHG | DIASTOLIC BLOOD PRESSURE: 83 MMHG

## 2021-05-24 VITALS — SYSTOLIC BLOOD PRESSURE: 157 MMHG | DIASTOLIC BLOOD PRESSURE: 89 MMHG

## 2021-05-24 VITALS — SYSTOLIC BLOOD PRESSURE: 148 MMHG | DIASTOLIC BLOOD PRESSURE: 83 MMHG
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
## 2021-05-24 DIAGNOSIS — E87.2: ICD-10-CM

## 2021-05-24 DIAGNOSIS — Z87.11: ICD-10-CM

## 2021-05-24 DIAGNOSIS — F17.210: ICD-10-CM

## 2021-05-24 DIAGNOSIS — D69.59: ICD-10-CM

## 2021-05-24 DIAGNOSIS — I10: ICD-10-CM

## 2021-05-24 DIAGNOSIS — S50.02XA: ICD-10-CM

## 2021-05-24 DIAGNOSIS — F43.10: ICD-10-CM

## 2021-05-24 DIAGNOSIS — F10.10: Primary | ICD-10-CM

## 2021-05-24 DIAGNOSIS — S80.02XA: ICD-10-CM

## 2021-05-24 DIAGNOSIS — K29.80: ICD-10-CM

## 2021-05-24 DIAGNOSIS — E86.0: ICD-10-CM

## 2021-05-24 DIAGNOSIS — J44.9: ICD-10-CM

## 2021-05-24 DIAGNOSIS — K70.10: ICD-10-CM

## 2021-05-24 DIAGNOSIS — K21.9: ICD-10-CM
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
## 2021-05-24 DIAGNOSIS — E87.1: ICD-10-CM

## 2021-05-24 DIAGNOSIS — D63.8: ICD-10-CM

## 2021-05-24 DIAGNOSIS — S00.11XA: ICD-10-CM

## 2021-05-24 DIAGNOSIS — Z20.822: ICD-10-CM

## 2021-05-24 DIAGNOSIS — K50.10: ICD-10-CM

## 2021-05-24 DIAGNOSIS — S00.12XA: ICD-10-CM

## 2021-05-24 DIAGNOSIS — S50.01XA: ICD-10-CM

## 2021-05-24 DIAGNOSIS — S80.01XA: ICD-10-CM

## 2021-05-24 LAB
ALBUMIN SERPL-MCNC: 5.7 G/DL (ref 3.2–5)
ALP SERPL-CCNC: 120 U/L (ref 38–126)
ANION GAP SERPL CALCULATED.3IONS-SCNC: 35 MMOL/L
AST SERPL-CCNC: 362 U/L (ref 17–59)
BASOPHILS NFR BLD AUTO: 0 % (ref 0–3)
BILIRUB UR QL STRIP.AUTO: NEGATIVE
BUN SERPL-MCNC: 18 MG/DL (ref 9–20)
BUN/CREAT SERPL: 22
CHLORIDE SERPL-SCNC: 83 MMOL/L (ref 95–108)
CK SERPL-CCNC: 261 U/L (ref 52–200)
CLARITY UR: (no result)
CO2 SERPL-SCNC: 14 MMOL/L (ref 22–30)
COLOR UR AUTO: (no result)
CREAT SERPL-MCNC: 0.8 MG/DL (ref 0.7–1.3)
EOSINOPHIL NFR BLD AUTO: 0 % (ref 0–8)
ERYTHROCYTE [DISTWIDTH] IN BLOOD BY AUTOMATED COUNT: 13 % (ref 11.5–15.5)
GLUCOSE UR STRIP.AUTO-MCNC: NEGATIVE MG/DL
HCT VFR BLD AUTO: 35 % (ref 39–50)
HGB BLD-MCNC: 12 G/DL (ref 14–18)
HGB UR QL STRIP.AUTO: (no result)
IMM GRANULOCYTES NFR BLD: 0.8 % (ref 0–5)
KETONES UR STRIP.AUTO-MCNC: >=80 MG/DL
LEUKOCYTE ESTERASE UR QL STRIP.AUTO: NEGATIVE
LYMPHOCYTES NFR BLD: 7 % (ref 15–41)
MCH RBC QN AUTO: 35.2 PG  CALC (ref 26–32)
MCHC RBC AUTO-ENTMCNC: 34.3 G/DL CAL (ref 32–36)
MCV RBC AUTO: 102.6 FL  CALC (ref 80–100)
MONOCYTES NFR BLD AUTO: 10 % (ref 2–13)
MYOGLOBIN SERPL-MCNC: 82 NG/ML (ref 0–121)
NEUTROPHILS # BLD AUTO: 7.15 THOU/UL (ref 1.82–7.42)
NEUTROPHILS NFR BLD AUTO: 82 % (ref 42–76)
NITRITE UR QL STRIP.AUTO: NEGATIVE
PH UR STRIP.AUTO: 6 [PH] (ref 4.5–8)
PLATELET # BLD AUTO: 105 THOU/UL (ref 130–400)
POTASSIUM SERPL-SCNC: 4.7 MMOL/L (ref 3.5–5.1)
PROT SERPL-MCNC: 9.3 G/DL (ref 6.3–8.2)
PROT UR STRIP.AUTO-MCNC: >=300 MG/DL
RBC # BLD AUTO: 3.41 MILL/UL (ref 4.7–6.1)
RBC #/AREA URNS HPF: (no result) RBC/HPF (ref 0–5)
SODIUM SERPL-SCNC: 127 MMOL/L (ref 137–146)
SP GR UR STRIP.AUTO: >=1.03
UROBILINOGEN UR QL STRIP.AUTO: 0.2 E.U./DL
WBC #/AREA URNS HPF: (no result) WBC/HPF (ref 0–5)

## 2021-05-24 NOTE — NUR
PRN HYDRALZINE ADMINISTERED FOR NIBP 157/89mmHg, PRN ATIVAN ADMINISITERED FOR
CIWA SCORE 0F 8. SEE E-MAR.

## 2021-05-24 NOTE — NUR
ADMISSION AND ASSESMENT COMPLETED. PT IS A/O X3, WHEN ASKED DATE GETS MONTH
CORRECT BUT STATES IT IS THE 2ND. LEFT PERIORBITAL ECCHYMOSIS AND LEFT EYE
SWOLLEN SHUT. DENIES PAIN. AFFECT IS FLAT. REPORTS NO ETOH X3 DAYS. REPORTS
PRIOR HE DRANK "A HANDLE OF LIQUOR" A DAY FOR SEVERAL MONTHS, STATES HE WALKED
OUT OF WORK "WASTED" SIX WEEKS AGO AND "IT'S PRETTY MUCH BEEN A [place]ENEGAS EVER
SINCE", WHEN ASKED WHY HE STOPPED CONSUMING ETOH THREE DAYS AGO STATES
"BECAUSE ITS JUST TAKING OVER MY LIFE". PT ADMITS TO FEELING DEPRESSED, DENIES
THOUGHT OR PLANS OF SELF HARM. PRIOR TO MEDICAL INTERVENTION IN ED, REPORTS
N/V, TREMORS, PAROXYSMAL SWEATS AND ANXIETY. CIWA SCORE OF 8 AT TIME OF
ASSESMENT, REPORTS DATE IS MAY 2ND AND REPORTS SEEING A "WOMAN IN WHITE"
STANDING IN ROOM WITH HIM.
PT REPORTS HE HAS BEEN UNABLE TO EAT FOR 3-4 DAYS AND HAS NOT HAD BM FOR 3-4
DAYS. FOOD PROVIDED PER PT'S REQUEST. PT TOLERATES FOOD PROVIDED.
L-AC #20G PATENT AND INFUSING 0.9% NACL @ 100ML/H.
PLAN OF CARE REVIEWED, PT VERBALIZES UNDERSTANDING. DENIES FURTHER NEEDS AT
THIS TIME. CALL BELL WITHIN REACH AGREES TO CALL PRN.

## 2021-05-24 NOTE — NUR
PT ARRIVES TO UNIT AT 2033 VIA STRETCHER ACCOMPANIED BY GINACARLO PLATT RN, PT ABLE
TO SLIDE SELF FROM STRETCHER TO BED. ATTATCHED TO MONITOR. ORIENTED TO UNIT
AND ROOM. PLAN OF CARE REVIEWED, PT VERBALIZES UNDERSTANDING. CALL BELL WITHIN
REACH, AGREES TO CALL PRN.

## 2021-05-25 VITALS — SYSTOLIC BLOOD PRESSURE: 140 MMHG | DIASTOLIC BLOOD PRESSURE: 82 MMHG

## 2021-05-25 VITALS — SYSTOLIC BLOOD PRESSURE: 136 MMHG | DIASTOLIC BLOOD PRESSURE: 75 MMHG

## 2021-05-25 VITALS — SYSTOLIC BLOOD PRESSURE: 152 MMHG | DIASTOLIC BLOOD PRESSURE: 81 MMHG

## 2021-05-25 VITALS — DIASTOLIC BLOOD PRESSURE: 92 MMHG | SYSTOLIC BLOOD PRESSURE: 151 MMHG

## 2021-05-25 VITALS — DIASTOLIC BLOOD PRESSURE: 80 MMHG | SYSTOLIC BLOOD PRESSURE: 147 MMHG

## 2021-05-25 VITALS — DIASTOLIC BLOOD PRESSURE: 88 MMHG | SYSTOLIC BLOOD PRESSURE: 150 MMHG

## 2021-05-25 VITALS — SYSTOLIC BLOOD PRESSURE: 156 MMHG | DIASTOLIC BLOOD PRESSURE: 89 MMHG

## 2021-05-25 VITALS — SYSTOLIC BLOOD PRESSURE: 151 MMHG | DIASTOLIC BLOOD PRESSURE: 67 MMHG

## 2021-05-25 VITALS — DIASTOLIC BLOOD PRESSURE: 78 MMHG | SYSTOLIC BLOOD PRESSURE: 149 MMHG

## 2021-05-25 VITALS — DIASTOLIC BLOOD PRESSURE: 82 MMHG | SYSTOLIC BLOOD PRESSURE: 160 MMHG

## 2021-05-25 VITALS — DIASTOLIC BLOOD PRESSURE: 78 MMHG | SYSTOLIC BLOOD PRESSURE: 138 MMHG

## 2021-05-25 VITALS — SYSTOLIC BLOOD PRESSURE: 154 MMHG | DIASTOLIC BLOOD PRESSURE: 80 MMHG

## 2021-05-25 VITALS — SYSTOLIC BLOOD PRESSURE: 143 MMHG | DIASTOLIC BLOOD PRESSURE: 73 MMHG

## 2021-05-25 VITALS — SYSTOLIC BLOOD PRESSURE: 149 MMHG | DIASTOLIC BLOOD PRESSURE: 77 MMHG

## 2021-05-25 VITALS — DIASTOLIC BLOOD PRESSURE: 84 MMHG | SYSTOLIC BLOOD PRESSURE: 155 MMHG

## 2021-05-25 VITALS — DIASTOLIC BLOOD PRESSURE: 88 MMHG | SYSTOLIC BLOOD PRESSURE: 149 MMHG

## 2021-05-25 VITALS — SYSTOLIC BLOOD PRESSURE: 150 MMHG | DIASTOLIC BLOOD PRESSURE: 81 MMHG

## 2021-05-25 VITALS — SYSTOLIC BLOOD PRESSURE: 150 MMHG | DIASTOLIC BLOOD PRESSURE: 75 MMHG

## 2021-05-25 VITALS — SYSTOLIC BLOOD PRESSURE: 156 MMHG | DIASTOLIC BLOOD PRESSURE: 84 MMHG

## 2021-05-25 VITALS — SYSTOLIC BLOOD PRESSURE: 165 MMHG | DIASTOLIC BLOOD PRESSURE: 82 MMHG

## 2021-05-25 VITALS — DIASTOLIC BLOOD PRESSURE: 84 MMHG | SYSTOLIC BLOOD PRESSURE: 160 MMHG

## 2021-05-25 LAB
ALBUMIN SERPL-MCNC: 4 G/DL (ref 3.2–5)
ALP SERPL-CCNC: 83 U/L (ref 38–126)
ANION GAP SERPL CALCULATED.3IONS-SCNC: 14 MMOL/L
AST SERPL-CCNC: 179 U/L (ref 17–59)
BASOPHILS NFR BLD AUTO: 0 % (ref 0–3)
BUN SERPL-MCNC: 11 MG/DL (ref 9–20)
BUN/CREAT SERPL: 18
CHLORIDE SERPL-SCNC: 94 MMOL/L (ref 95–108)
CO2 SERPL-SCNC: 24 MMOL/L (ref 22–30)
CREAT SERPL-MCNC: 0.6 MG/DL (ref 0.7–1.3)
EOSINOPHIL NFR BLD AUTO: 0 % (ref 0–8)
ERYTHROCYTE [DISTWIDTH] IN BLOOD BY AUTOMATED COUNT: 12.9 % (ref 11.5–15.5)
HCT VFR BLD AUTO: 27.3 % (ref 39–50)
HGB BLD-MCNC: 9.7 G/DL (ref 14–18)
IMM GRANULOCYTES NFR BLD: 0.4 % (ref 0–5)
LYMPHOCYTES NFR BLD: 26 % (ref 15–41)
MCH RBC QN AUTO: 35.4 PG  CALC (ref 26–32)
MCHC RBC AUTO-ENTMCNC: 35.5 G/DL CAL (ref 32–36)
MCV RBC AUTO: 99.6 FL  CALC (ref 80–100)
MONOCYTES NFR BLD AUTO: 9 % (ref 2–13)
NEUTROPHILS # BLD AUTO: 2.89 THOU/UL (ref 1.82–7.42)
NEUTROPHILS NFR BLD AUTO: 64 % (ref 42–76)
PLATELET # BLD AUTO: 69 THOU/UL (ref 130–400)
POTASSIUM SERPL-SCNC: 3.8 MMOL/L (ref 3.5–5.1)
PROT SERPL-MCNC: 6.4 G/DL (ref 6.3–8.2)
RBC # BLD AUTO: 2.74 MILL/UL (ref 4.7–6.1)
SODIUM SERPL-SCNC: 128 MMOL/L (ref 137–146)

## 2021-05-25 NOTE — NUR
RECEIVED REPORT. PT AWAKE. ALERT. ATE VERY LITTLE DINNER BUT DRANK FLUIDS.
BANANA BAG INFUSING AS RX IN RIGHT A/C (IV). C/O NAUSEA AND OF BEING BORED.
DENIES JITTERY OR NERVOUS FEELINGS

## 2021-05-25 NOTE — NUR
PATIENT WOKE UP ALLOWED ME TO ASSES HIM, TOLD HIM HIS MEAL TRAY WILL BE COMING
UP SOON. PATIENT WENT BACK TO SLEEP.

## 2021-05-25 NOTE — NUR
PATIENT COMPLAINED OF NAUSEA, DR. DUDLEY GAVE NEW ORDERS FROM ISABEL DUMONT.
ORDER IS FAXED TO [facility].

## 2021-05-25 NOTE — NUR
PT APPEARS TO BE SLEEPING COMFORTABLY, EYES CLOSED, SNORING, RESPIRATIONS
UNLABORED, NO APPARENT DISTRESS. CALL PEREZ REMAINS WITHIN REACH.

## 2021-05-26 VITALS — SYSTOLIC BLOOD PRESSURE: 155 MMHG | DIASTOLIC BLOOD PRESSURE: 86 MMHG

## 2021-05-26 VITALS — SYSTOLIC BLOOD PRESSURE: 126 MMHG | DIASTOLIC BLOOD PRESSURE: 81 MMHG

## 2021-05-26 VITALS — SYSTOLIC BLOOD PRESSURE: 128 MMHG | DIASTOLIC BLOOD PRESSURE: 83 MMHG

## 2021-05-26 VITALS — DIASTOLIC BLOOD PRESSURE: 82 MMHG | SYSTOLIC BLOOD PRESSURE: 152 MMHG

## 2021-05-26 VITALS — SYSTOLIC BLOOD PRESSURE: 164 MMHG | DIASTOLIC BLOOD PRESSURE: 85 MMHG

## 2021-05-26 VITALS — DIASTOLIC BLOOD PRESSURE: 83 MMHG | SYSTOLIC BLOOD PRESSURE: 155 MMHG

## 2021-05-26 VITALS — SYSTOLIC BLOOD PRESSURE: 143 MMHG | DIASTOLIC BLOOD PRESSURE: 72 MMHG

## 2021-05-26 VITALS — DIASTOLIC BLOOD PRESSURE: 85 MMHG | SYSTOLIC BLOOD PRESSURE: 167 MMHG

## 2021-05-26 VITALS — DIASTOLIC BLOOD PRESSURE: 82 MMHG | SYSTOLIC BLOOD PRESSURE: 151 MMHG

## 2021-05-26 VITALS — SYSTOLIC BLOOD PRESSURE: 117 MMHG | DIASTOLIC BLOOD PRESSURE: 64 MMHG

## 2021-05-26 VITALS — SYSTOLIC BLOOD PRESSURE: 153 MMHG | DIASTOLIC BLOOD PRESSURE: 84 MMHG

## 2021-05-26 VITALS — SYSTOLIC BLOOD PRESSURE: 160 MMHG | DIASTOLIC BLOOD PRESSURE: 81 MMHG

## 2021-05-26 LAB
ALBUMIN SERPL-MCNC: 3.6 G/DL (ref 3.2–5)
ALP SERPL-CCNC: 76 U/L (ref 38–126)
ANION GAP SERPL CALCULATED.3IONS-SCNC: 13 MMOL/L
AST SERPL-CCNC: 172 U/L (ref 17–59)
BUN SERPL-MCNC: 11 MG/DL (ref 9–20)
BUN/CREAT SERPL: 18
CHLORIDE SERPL-SCNC: 95 MMOL/L (ref 95–108)
CO2 SERPL-SCNC: 23 MMOL/L (ref 22–30)
CREAT SERPL-MCNC: 0.6 MG/DL (ref 0.7–1.3)
ERYTHROCYTE [DISTWIDTH] IN BLOOD BY AUTOMATED COUNT: 12.7 % (ref 11.5–15.5)
HCT VFR BLD AUTO: 25.2 % (ref 39–50)
HGB BLD-MCNC: 8.8 G/DL (ref 14–18)
MCH RBC QN AUTO: 35.2 PG  CALC (ref 26–32)
MCHC RBC AUTO-ENTMCNC: 34.9 G/DL CAL (ref 32–36)
MCV RBC AUTO: 100.8 FL  CALC (ref 80–100)
PLATELET # BLD AUTO: 57 THOU/UL (ref 130–400)
POTASSIUM SERPL-SCNC: 3.3 MMOL/L (ref 3.5–5.1)
PROT SERPL-MCNC: 5.8 G/DL (ref 6.3–8.2)
RBC # BLD AUTO: 2.5 MILL/UL (ref 4.7–6.1)
SODIUM SERPL-SCNC: 128 MMOL/L (ref 137–146)

## 2021-05-26 NOTE — NUR
PT TAKEN TO MED SURG PER W/C, REMAINS ALERT/ORIENTED X3, SMILING,  ALL
BELONGINGS FROM ER SENT WITH PT TO MED SURG

## 2021-05-26 NOTE — NUR
PT REPORT RECEIVED FROM NIGHT SHIFT.  PT ALERT/ORIENTED X3, DENIES ANY
COMPLAINTS AT THIS TIME, STATES LEFT EYE IS OPENING BETTER, NO SIGHT PROBLEMS,
 REMAINS WITH BRUISING TO KELIN EYES AND LEFT SIDE OF FOREHEAD FROM FALL WHILE
DRINKING HE STATES.  STATES DRINKS ALOT OF HARD ALCOHOL EVERY DAY FOR YEARS.

## 2021-05-26 NOTE — NUR
PT ARRIVED TO [facility] ROOM 279 VIA WHEELCHAIR IN STABLE CONDITION ACCOMPAINED
BY AURA AGUDELO. PT IS A/O X3. ASSESSMENT AND VITALS COMPLETED. /83, HR 73,
O2 100% ON ROOM AIR. REPSIRATIONS ARE EVEN AND UNLABORED WITH NO DISTRESS.
LUNG SOUNDS CLEAR. HEART RHYTHM NORMAL. BOWEL SOUNDS ARE ACTIVE. RADIAL AND
PEDAL PULSES ARE STRONG. #20G IN RAC AND #22G IN LH FLUSHED, SITE APPEARS
HEALTHY AND PATENT. SKIN INTACT. BRUISING NOTED AROUND BILATERAL EYES.
HEMATOMA NOTED TO LEFT FOREHEAD. PT COMPLAINS OF 4/10 HEADACHE, PT TO BE
MEDICATED PER EMAR. PT DENIES OF ANY OTHER NEEDS. ALL SAFETY PRECAUTIONS ARE
IN PLACE. WILL CONTINUE TO MONITOR.

## 2021-05-26 NOTE — NUR
PT SITTING UP IN CHAIR AT START OF SHIFT. NOTED THAT IV SITE TO R WRIST WAS
DISLODGED AND HE CATHETER WAS HANGING OUT OF THE VEIN UNDER THE DRESSING. SITE
TO R WRIST D/C'D. NEW SITE STARTED TO RAC #20 X 1 ATTEMPT. NO COMPICATIONS
NOTED. PT TOLERATED WELL. SAFETY PRECAUTIONS IN PLACE, BED IN LOWEST
POSITION, CALL LIGHT WITHIN REACH. WILL MONITOR

## 2021-05-26 NOTE — NUR
PT SITTING UP IN RECYLINER WATCHING TC. RESPIRATIONS ARE EVEN AND UNLABORED
WITH NO DITRESS. #20G IN RFA REMOVED WITH CATHATER STILL INTACT. #22G IN RW
REMAINS IN PLACE, SITE APPEARS HEALTHY AND PATENT. PT DENIES OF ANY PAINS AT
THIS TIME. ALL SAFETY PRECAUTIONS ARE IN PLACE WITH CALL LIGHT IN REACH. WILL
CONTINUE TO MONITOR.

## 2021-05-26 NOTE — NUR
PT REQUESTED FOR IV FLUIDS TO BE STOPPED AS "ALL I DO IS PISS". STATES HE IS
DRINKING PO FLUIDS AND DOES NOT THINK HE NEEDS THE IV FLUIDS. AGREE TO HAVE
THE IV MEDICATION "LITTLE BAGS ARE OK... JUST NO MORE BIG BAGS"

## 2021-05-27 VITALS — SYSTOLIC BLOOD PRESSURE: 139 MMHG | DIASTOLIC BLOOD PRESSURE: 85 MMHG

## 2021-05-27 VITALS — SYSTOLIC BLOOD PRESSURE: 130 MMHG | DIASTOLIC BLOOD PRESSURE: 77 MMHG

## 2021-05-27 LAB
ANION GAP SERPL CALCULATED.3IONS-SCNC: 10 MMOL/L
BUN SERPL-MCNC: 11 MG/DL (ref 9–20)
BUN/CREAT SERPL: 21
CHLORIDE SERPL-SCNC: 94 MMOL/L (ref 95–108)
CO2 SERPL-SCNC: 28 MMOL/L (ref 22–30)
CREAT SERPL-MCNC: 0.5 MG/DL (ref 0.7–1.3)
ERYTHROCYTE [DISTWIDTH] IN BLOOD BY AUTOMATED COUNT: 12.4 % (ref 11.5–15.5)
HCT VFR BLD AUTO: 24.4 % (ref 39–50)
HGB BLD-MCNC: 8.5 G/DL (ref 14–18)
MAGNESIUM SERPL-MCNC: 1.1 MG/DL (ref 1.6–2.3)
MCH RBC QN AUTO: 34.7 PG  CALC (ref 26–32)
MCHC RBC AUTO-ENTMCNC: 34.8 G/DL CAL (ref 32–36)
MCV RBC AUTO: 99.6 FL  CALC (ref 80–100)
PLATELET # BLD AUTO: 89 THOU/UL (ref 130–400)
POTASSIUM SERPL-SCNC: 3 MMOL/L (ref 3.5–5.1)
RBC # BLD AUTO: 2.45 MILL/UL (ref 4.7–6.1)
SODIUM SERPL-SCNC: 129 MMOL/L (ref 137–146)

## 2021-05-27 NOTE — NUR
PT RESTING IN SEMI FOWLERS POSITION. PT IS A/OX3. ASSESSMENT AND VITALS
COMPLETED. /85, HR 90, O2 100% ON ROOM AIR. RESPIRATIONS ARE EVEN AND
UNLABORED WITH NO DISRTESS. LUNG SOUNDS ARE CLEAR. HEART RHYTHM IS NORMAL.
BOWEL SOUNDS ARE ACTIVE. #20G RAC FLUSHED, SITE REMAINS HEALTHY AND PATENT.
LEFT FOREHEAD HEMATOMA AND BRUISING AROUND EYES NOTED. SKIN INTACT. PT DENIES
OF ANY PAINS OR DISCOMFORTS AT THIS TIME. ALL SAFETY PRECAUTIONS ARE IN PLACE
WITH CALL LIGHT IN REACH. INSTRUCTED PT TO CALL FOR ASSISTANCE. WILL CONTINUE
TO MONITOR.

## 2021-05-27 NOTE — NUR
Discharge instructions given. Patient verbalizes understanding of same.
Discharged in stable condition via Wheelchair to Home with
staff. All belongings sent with pt.
 
PT DC HOME IN STABLE CONDITION VIA TAXI IN STABLE CONDITION WITH ALL
BELONGINGS AND CD INSTRUCTIONS.

## 2021-05-27 NOTE — NUR
PT WORRIED THAT [facility] WAS ABLE TO FILL PRESCRIPTIONS TODAY. [facility] CALLED VIA THIS WRITER. WRITER INFORMED THAT MEDICATIONS COULD BE
FILLED TODAY. PHARMACY CLOSES AT 9 PM. PT INFORMED. VERBALIZED UNDERSTANDING.

## 2021-05-27 NOTE — NUR
PT SITTING UP IN RECYLINER. RESPIRATIONS ARE EVEN AND UNLAORED WITH NO
DISRTESS NOTED. #20G IN RAC INFUSING WITH IV MAG, SITE APPEARS HEALTHY AND
PATENT. PT INFORMED OF DC AFTER ADMINSTRATION OF MAG. PT STATES THAT HE DOES
NOT HAVE A RIDE FOR TODAY. OFFERED TO OBTAIN TAXI. PT ACCEPT. ALL SAFETY
PRECAUTIONS ARE IN PLACE WITH CALL LIGHT IN REACH. WILL CONTINUE TO MONITOR.

## 2021-05-27 NOTE — NUR
PT EDUCATED ON DC INSTRUCTIONS AND NEW MEDATIONS THAT WERE SENT TO [facility].PT VERBLAIZED UNDERSTANDING.  #20G IN RAC REMOVED WITH ATHATER STILL
INTACT.TAXI TO BE CALLED. ALL SAFETY PRECAUTIONS ARE IN PLACE WITH CALL LIGHT
IN REACH. WILL CONTINUE TO MONITOR.

## 2021-05-27 NOTE — NUR
PT RESTING QUIETLY AT THIS TIME. NO COMPLAINTS VOICED. BREATHING EVEN AND
UNLABORED. CIWA ASSESSMENT COMPLETED. DENIES PAIN. SAFETY PRECAUTIONS IN
PLACE, CALL LIGHT WITHIN REACH

## 2021-05-27 NOTE — NUR
PT CONTINUES TO REST QUIETLY IN BED WITH HIS EYES CLOSED. NO COMPLAINT VOICED.
DENIES PAIN. SAFETY PRECAUTIONS IN PLACE. WILL CONTINUE TO MONITOR

## 2021-06-19 ENCOUNTER — HOSPITAL ENCOUNTER (INPATIENT)
Dept: HOSPITAL 82 - ED | Age: 41
LOS: 10 days | Discharge: NURSING, REHAB OR LONG-TERM CARE | DRG: 809 | End: 2021-06-29
Attending: INTERNAL MEDICINE | Admitting: INTERNAL MEDICINE
Payer: COMMERCIAL

## 2021-06-19 VITALS — SYSTOLIC BLOOD PRESSURE: 110 MMHG | DIASTOLIC BLOOD PRESSURE: 64 MMHG

## 2021-06-19 VITALS — SYSTOLIC BLOOD PRESSURE: 111 MMHG | DIASTOLIC BLOOD PRESSURE: 62 MMHG

## 2021-06-19 VITALS — DIASTOLIC BLOOD PRESSURE: 67 MMHG | SYSTOLIC BLOOD PRESSURE: 128 MMHG

## 2021-06-19 VITALS — SYSTOLIC BLOOD PRESSURE: 109 MMHG | DIASTOLIC BLOOD PRESSURE: 63 MMHG

## 2021-06-19 VITALS — DIASTOLIC BLOOD PRESSURE: 67 MMHG | SYSTOLIC BLOOD PRESSURE: 123 MMHG

## 2021-06-19 VITALS — HEIGHT: 65 IN | BODY MASS INDEX: 26.45 KG/M2 | WEIGHT: 158.73 LBS

## 2021-06-19 VITALS — DIASTOLIC BLOOD PRESSURE: 53 MMHG | SYSTOLIC BLOOD PRESSURE: 102 MMHG

## 2021-06-19 DIAGNOSIS — Z87.11: ICD-10-CM

## 2021-06-19 DIAGNOSIS — K70.10: ICD-10-CM

## 2021-06-19 DIAGNOSIS — Z20.822: ICD-10-CM

## 2021-06-19 DIAGNOSIS — F32.9: ICD-10-CM

## 2021-06-19 DIAGNOSIS — N17.9: ICD-10-CM

## 2021-06-19 DIAGNOSIS — E87.2: ICD-10-CM

## 2021-06-19 DIAGNOSIS — J44.9: ICD-10-CM

## 2021-06-19 DIAGNOSIS — K21.9: ICD-10-CM

## 2021-06-19 DIAGNOSIS — E83.42: ICD-10-CM

## 2021-06-19 DIAGNOSIS — E86.0: ICD-10-CM

## 2021-06-19 DIAGNOSIS — F10.239: ICD-10-CM

## 2021-06-19 DIAGNOSIS — F17.200: ICD-10-CM

## 2021-06-19 DIAGNOSIS — I10: ICD-10-CM

## 2021-06-19 DIAGNOSIS — D61.818: Primary | ICD-10-CM

## 2021-06-19 DIAGNOSIS — E87.1: ICD-10-CM

## 2021-06-19 LAB
ALBUMIN SERPL-MCNC: 4.7 G/DL (ref 3.2–5)
ALP SERPL-CCNC: 115 U/L (ref 38–126)
ANION GAP SERPL CALCULATED.3IONS-SCNC: 35 MMOL/L
AST SERPL-CCNC: 298 U/L (ref 17–59)
BASOPHILS NFR BLD AUTO: 0 % (ref 0–3)
BILIRUB UR QL STRIP.AUTO: (no result)
BUN SERPL-MCNC: 35 MG/DL (ref 9–20)
BUN/CREAT SERPL: 13
CHLORIDE SERPL-SCNC: 92 MMOL/L (ref 95–108)
CO2 SERPL-SCNC: 14 MMOL/L (ref 22–30)
COLOR UR AUTO: YELLOW
CREAT SERPL-MCNC: 2.7 MG/DL (ref 0.7–1.3)
EOSINOPHIL NFR BLD AUTO: 0 % (ref 0–8)
ERYTHROCYTE [DISTWIDTH] IN BLOOD BY AUTOMATED COUNT: 14.4 % (ref 11.5–15.5)
GLUCOSE UR STRIP.AUTO-MCNC: NEGATIVE MG/DL
HCT VFR BLD AUTO: 25.9 % (ref 39–50)
HGB BLD-MCNC: 8.5 G/DL (ref 14–18)
HGB UR QL STRIP.AUTO: (no result)
IMM GRANULOCYTES NFR BLD: 0.4 % (ref 0–5)
KETONES UR STRIP.AUTO-MCNC: 40 MG/DL
LEUKOCYTE ESTERASE UR QL STRIP.AUTO: NEGATIVE
LYMPHOCYTES NFR BLD: 22 % (ref 15–41)
MAGNESIUM SERPL-MCNC: 1.2 MG/DL (ref 1.6–2.3)
MCH RBC QN AUTO: 34.7 PG  CALC (ref 26–32)
MCHC RBC AUTO-ENTMCNC: 32.8 G/DL CAL (ref 32–36)
MCV RBC AUTO: 105.7 FL  CALC (ref 80–100)
MONOCYTES NFR BLD AUTO: 12 % (ref 2–13)
NEUTROPHILS # BLD AUTO: 3.63 THOU/UL (ref 1.82–7.42)
NEUTROPHILS NFR BLD AUTO: 65 % (ref 42–76)
NITRITE UR QL STRIP.AUTO: NEGATIVE
PH UR STRIP.AUTO: 5.5 [PH] (ref 4.5–8)
PLATELET # BLD AUTO: 86 THOU/UL (ref 130–400)
POTASSIUM SERPL-SCNC: 4.7 MMOL/L (ref 3.5–5.1)
PROT SERPL-MCNC: 7.8 G/DL (ref 6.3–8.2)
PROT UR QL STRIP.AUTO: 100 MG/DL
RBC # BLD AUTO: 2.45 MILL/UL (ref 4.7–6.1)
RBC #/AREA URNS HPF: (no result) RBC/HPF (ref 0–5)
SODIUM SERPL-SCNC: 136 MMOL/L (ref 137–146)
SP GR UR STRIP.AUTO: 1.02
UROBILINOGEN UR QL STRIP.AUTO: 0.2 E.U./DL
WBC #/AREA URNS HPF: (no result) WBC/HPF (ref 0–5)

## 2021-06-19 PROCEDURE — S0164 INJECTION PANTROPRAZOLE: HCPCS

## 2021-06-19 PROCEDURE — P9016 RBC LEUKOCYTES REDUCED: HCPCS

## 2021-06-19 NOTE — NUR
PATIENT ASSESSED. ADMISSION IS COMPLETED. PATIENT HAS LEFT FACE BRUISE. STATED
THAT IT IS A WEEK OLD. HE FELL DOWN AT HOME AND GOT "BEAT UP". LUNGS ARE
CLEAR. A/O X3. SCORED A 1 ON THE CIWA. HYPOACTIVE BOWLSOUNDS. NO EDEMA PRESENT
AT THIS TIME. STATED THAT HE IS WEAK WHEN HE WALKS BECAUSE "i DONT USE MY
MUSCLES ANYMORE SO I JUST FALL ALL THE TIME". FALL PRECAUTIONS IN PLACED.
EDUCATION WAS PROVIDED ABOUT IT, STATED THAT HE WILL ALWAYS CALL WHEN HE NEEDS
HELP. NO PAIN OR DISCOMFORT AT THIS TIME. HE COMPLAINS OF BEING NPO PER
DOCTORS ORDERS. EXPLAINED TO HIM THE REASONING HE STATED HE JUST DOESN'T
UNDERSTAND WHY HE CAN'T HAVE JUST ONE MEAL. SAFETY MEASURES IN PLACE. CALL
LIGHT IN REACH. WILL CONTINUE TO MONITOR.

## 2021-06-19 NOTE — NUR
awakens easily. c/o gen weakness. no active bleeding. cardiac monitor shows
sinus rhythm hr 81.  #22 rfa 1/2ns infusing @ 150cchr. no po intake or void @
present. bruising on lt side of body conts. says he fell 1 wk ago.  fall
precautions cont.

## 2021-06-20 VITALS — DIASTOLIC BLOOD PRESSURE: 63 MMHG | SYSTOLIC BLOOD PRESSURE: 107 MMHG

## 2021-06-20 VITALS — DIASTOLIC BLOOD PRESSURE: 64 MMHG | SYSTOLIC BLOOD PRESSURE: 106 MMHG

## 2021-06-20 VITALS — SYSTOLIC BLOOD PRESSURE: 104 MMHG | DIASTOLIC BLOOD PRESSURE: 60 MMHG

## 2021-06-20 VITALS — DIASTOLIC BLOOD PRESSURE: 70 MMHG | SYSTOLIC BLOOD PRESSURE: 112 MMHG

## 2021-06-20 VITALS — DIASTOLIC BLOOD PRESSURE: 61 MMHG | SYSTOLIC BLOOD PRESSURE: 105 MMHG

## 2021-06-20 VITALS — SYSTOLIC BLOOD PRESSURE: 114 MMHG | DIASTOLIC BLOOD PRESSURE: 76 MMHG

## 2021-06-20 VITALS — DIASTOLIC BLOOD PRESSURE: 68 MMHG | SYSTOLIC BLOOD PRESSURE: 118 MMHG

## 2021-06-20 VITALS — DIASTOLIC BLOOD PRESSURE: 72 MMHG | SYSTOLIC BLOOD PRESSURE: 118 MMHG

## 2021-06-20 VITALS — SYSTOLIC BLOOD PRESSURE: 111 MMHG | DIASTOLIC BLOOD PRESSURE: 62 MMHG

## 2021-06-20 VITALS — DIASTOLIC BLOOD PRESSURE: 75 MMHG | SYSTOLIC BLOOD PRESSURE: 123 MMHG

## 2021-06-20 VITALS — DIASTOLIC BLOOD PRESSURE: 69 MMHG | SYSTOLIC BLOOD PRESSURE: 114 MMHG

## 2021-06-20 LAB
ALBUMIN SERPL-MCNC: 3.2 G/DL (ref 3.2–5)
ALP SERPL-CCNC: 100 U/L (ref 38–126)
ANION GAP SERPL CALCULATED.3IONS-SCNC: 14 MMOL/L
AST SERPL-CCNC: 268 U/L (ref 17–59)
BASOPHILS NFR BLD AUTO: 1 % (ref 0–3)
BUN SERPL-MCNC: 23 MG/DL (ref 9–20)
BUN/CREAT SERPL: 14
CHLORIDE SERPL-SCNC: 98 MMOL/L (ref 95–108)
CO2 SERPL-SCNC: 23 MMOL/L (ref 22–30)
CREAT SERPL-MCNC: 1.6 MG/DL (ref 0.7–1.3)
EOSINOPHIL NFR BLD AUTO: 2 % (ref 0–8)
ERYTHROCYTE [DISTWIDTH] IN BLOOD BY AUTOMATED COUNT: 13.8 % (ref 11.5–15.5)
HCT VFR BLD AUTO: 20.8 % (ref 39–50)
HGB BLD-MCNC: 7 G/DL (ref 14–18)
IMM GRANULOCYTES NFR BLD: 0.9 % (ref 0–5)
LYMPHOCYTES NFR BLD: 35 % (ref 15–41)
MCH RBC QN AUTO: 35 PG  CALC (ref 26–32)
MCHC RBC AUTO-ENTMCNC: 33.7 G/DL CAL (ref 32–36)
MCV RBC AUTO: 104 FL  CALC (ref 80–100)
MONOCYTES NFR BLD AUTO: 11 % (ref 2–13)
NEUTROPHILS # BLD AUTO: 1.19 THOU/UL (ref 1.82–7.42)
NEUTROPHILS NFR BLD AUTO: 51 % (ref 42–76)
PLATELET # BLD AUTO: 35 THOU/UL (ref 130–400)
POTASSIUM SERPL-SCNC: 3.8 MMOL/L (ref 3.5–5.1)
PROT SERPL-MCNC: 5.5 G/DL (ref 6.3–8.2)
RBC # BLD AUTO: 2 MILL/UL (ref 4.7–6.1)
SODIUM SERPL-SCNC: 131 MMOL/L (ref 137–146)

## 2021-06-20 NOTE — NUR
PATIENT ASSESSED. A/O X3. ABLE TO MAKE NEEDS KNOWN. STATES HE IS HAVING NO
PAIN AT THIS TIME. SCORED A 0 ON CIWA AT THIS TIME. CLEAR LUNG SOUNDS
THROUGHOUT. NORMAL HEART SOUNDS, SR ON MONITOR. DID GO UP -120
THIS AM WHEN
TRANSFERRING TO CHAIR AT BEDSIDE. PATIENT STATED HE WASN'T DIZZY OR FEELING
NAUSEA. ACTIVE BOWEL SOUNDS. NO EDEMA PRESENT. WEAK GAIT WHEN WALKING TO THE
CHAIR. EATING BREAKFAST AT THIS TIME. STRIPPED THE BED TO CHANGE HIS LINENS.
OFFERED A BEDBATH AFTER HE EATS, STATED THAT HE DOESN'T WANT IT TILL HE
FEELING STRONGER. INFORMED HIM IM HERE TILL 7PM, ANYTIME BEFORE THEN IF HE
CHANGES HIS MIND, ILL GLADLY HELP. SAFETY MEASURES IN PLACE. CALL LIGHT IN
REACH. WILL CONTINUE TO MONITOR.

## 2021-06-20 NOTE — NUR
awake. denies distress. appears to feel better tonite. no active bleeding.
cardiac monitor shows sinus rhythm hr 76. #22 rfa banana bag infusing @
100cchr. po fluids taken well. voids per urinal. fall precautions cont.

## 2021-06-21 VITALS — SYSTOLIC BLOOD PRESSURE: 117 MMHG | DIASTOLIC BLOOD PRESSURE: 76 MMHG

## 2021-06-21 VITALS — SYSTOLIC BLOOD PRESSURE: 107 MMHG | DIASTOLIC BLOOD PRESSURE: 65 MMHG

## 2021-06-21 VITALS — DIASTOLIC BLOOD PRESSURE: 74 MMHG | SYSTOLIC BLOOD PRESSURE: 112 MMHG

## 2021-06-21 VITALS — DIASTOLIC BLOOD PRESSURE: 64 MMHG | SYSTOLIC BLOOD PRESSURE: 105 MMHG

## 2021-06-21 VITALS — DIASTOLIC BLOOD PRESSURE: 74 MMHG | SYSTOLIC BLOOD PRESSURE: 99 MMHG

## 2021-06-21 VITALS — DIASTOLIC BLOOD PRESSURE: 62 MMHG | SYSTOLIC BLOOD PRESSURE: 106 MMHG

## 2021-06-21 VITALS — DIASTOLIC BLOOD PRESSURE: 69 MMHG | SYSTOLIC BLOOD PRESSURE: 111 MMHG

## 2021-06-21 VITALS — SYSTOLIC BLOOD PRESSURE: 132 MMHG | DIASTOLIC BLOOD PRESSURE: 88 MMHG

## 2021-06-21 VITALS — DIASTOLIC BLOOD PRESSURE: 71 MMHG | SYSTOLIC BLOOD PRESSURE: 108 MMHG

## 2021-06-21 VITALS — DIASTOLIC BLOOD PRESSURE: 73 MMHG | SYSTOLIC BLOOD PRESSURE: 111 MMHG

## 2021-06-21 VITALS — SYSTOLIC BLOOD PRESSURE: 103 MMHG | DIASTOLIC BLOOD PRESSURE: 65 MMHG

## 2021-06-21 VITALS — SYSTOLIC BLOOD PRESSURE: 112 MMHG | DIASTOLIC BLOOD PRESSURE: 72 MMHG

## 2021-06-21 VITALS — SYSTOLIC BLOOD PRESSURE: 114 MMHG | DIASTOLIC BLOOD PRESSURE: 67 MMHG

## 2021-06-21 LAB
ALBUMIN SERPL-MCNC: 2.9 G/DL (ref 3.2–5)
ALP SERPL-CCNC: 105 U/L (ref 38–126)
ANION GAP SERPL CALCULATED.3IONS-SCNC: 10 MMOL/L
AST SERPL-CCNC: 417 U/L (ref 17–59)
BASOPHILS NFR BLD AUTO: 1 % (ref 0–3)
BUN SERPL-MCNC: 15 MG/DL (ref 9–20)
BUN/CREAT SERPL: 13
CHLORIDE SERPL-SCNC: 98 MMOL/L (ref 95–108)
CO2 SERPL-SCNC: 27 MMOL/L (ref 22–30)
CREAT SERPL-MCNC: 1.2 MG/DL (ref 0.7–1.3)
EOSINOPHIL NFR BLD AUTO: 4 % (ref 0–8)
ERYTHROCYTE [DISTWIDTH] IN BLOOD BY AUTOMATED COUNT: 13.6 % (ref 11.5–15.5)
HCT VFR BLD AUTO: 18.5 % (ref 39–50)
HGB BLD-MCNC: 6.4 G/DL (ref 14–18)
IMM GRANULOCYTES NFR BLD: 0.5 % (ref 0–5)
INR PPP: 1 RATIO (ref 0.7–1.3)
LYMPHOCYTES NFR BLD: 38 % (ref 15–41)
MAGNESIUM SERPL-MCNC: 1 MG/DL (ref 1.6–2.3)
MCH RBC QN AUTO: 35.6 PG  CALC (ref 26–32)
MCHC RBC AUTO-ENTMCNC: 34.6 G/DL CAL (ref 32–36)
MCV RBC AUTO: 102.8 FL  CALC (ref 80–100)
MONOCYTES NFR BLD AUTO: 8 % (ref 2–13)
NEUTROPHILS # BLD AUTO: 0.91 THOU/UL (ref 1.82–7.42)
NEUTROPHILS NFR BLD AUTO: 48 % (ref 42–76)
PLATELET # BLD AUTO: 32 THOU/UL (ref 130–400)
POTASSIUM SERPL-SCNC: 3.5 MMOL/L (ref 3.5–5.1)
PROT SERPL-MCNC: 5.3 G/DL (ref 6.3–8.2)
PROTHROMBIN TIME: 9.8 SECONDS (ref 9–12.5)
RBC # BLD AUTO: 1.8 MILL/UL (ref 4.7–6.1)
SODIUM SERPL-SCNC: 132 MMOL/L (ref 137–146)

## 2021-06-21 PROCEDURE — 30233N1 TRANSFUSION OF NONAUTOLOGOUS RED BLOOD CELLS INTO PERIPHERAL VEIN, PERCUTANEOUS APPROACH: ICD-10-PCS | Performed by: INTERNAL MEDICINE

## 2021-06-21 NOTE — NUR
PT ALERT AND ORIENTED X 3.  LUNGS CLEAR, RA.  PT DOES NOT EXHIBIT ANY SYMPTOMS
OF ALCOHOL WITHDRAWAL, I.E., NO SHAKING, NO CONFUSION, NO HALLUCINATIONS.  PT
SEEN BY DR BARR THIS MORNING, NEW ORDERS BEING CARRIED OUT.  PT RECEIVES
UNIT OF PRBCs AT THIS TIME WITHOUT ADVERSE EFFECT.  PT SEEN BY PHYSICAL
THERAPIST.  PT HOPES TO CHECK IN TO REHAB CENTER UPON DISCHARGE.

## 2021-06-21 NOTE — NUR
BLOOD TRANSFUSION CONTINUES WITHOUT ADVERSE REACTION.  PT WITHOUT EVIDENCE OF
WITHDRAWAL SYMPTOMS FROM ALCOHOL.

## 2021-06-21 NOTE — NUR
BLOOD TRANSFUSION X 2 UNITS PRBCs HAVE COMPLETED WITHOUT EVENT.  PT UP AT
BEDSIDE TO USE URINAL.  PHYSICAL THERAPY HAS WORKED WITH PT TODAY.  NO
DISTRESS, NO WITHDRAWAL SYMPTOMS.

## 2021-06-22 VITALS — SYSTOLIC BLOOD PRESSURE: 120 MMHG | DIASTOLIC BLOOD PRESSURE: 80 MMHG

## 2021-06-22 VITALS — SYSTOLIC BLOOD PRESSURE: 115 MMHG | DIASTOLIC BLOOD PRESSURE: 80 MMHG

## 2021-06-22 VITALS — DIASTOLIC BLOOD PRESSURE: 77 MMHG | SYSTOLIC BLOOD PRESSURE: 117 MMHG

## 2021-06-22 VITALS — SYSTOLIC BLOOD PRESSURE: 131 MMHG | DIASTOLIC BLOOD PRESSURE: 91 MMHG

## 2021-06-22 VITALS — SYSTOLIC BLOOD PRESSURE: 121 MMHG | DIASTOLIC BLOOD PRESSURE: 83 MMHG

## 2021-06-22 VITALS — DIASTOLIC BLOOD PRESSURE: 71 MMHG | SYSTOLIC BLOOD PRESSURE: 113 MMHG

## 2021-06-22 VITALS — SYSTOLIC BLOOD PRESSURE: 115 MMHG | DIASTOLIC BLOOD PRESSURE: 76 MMHG

## 2021-06-22 VITALS — SYSTOLIC BLOOD PRESSURE: 115 MMHG | DIASTOLIC BLOOD PRESSURE: 77 MMHG

## 2021-06-22 VITALS — SYSTOLIC BLOOD PRESSURE: 131 MMHG | DIASTOLIC BLOOD PRESSURE: 78 MMHG

## 2021-06-22 LAB
ANION GAP SERPL CALCULATED.3IONS-SCNC: 9 MMOL/L
BUN SERPL-MCNC: 11 MG/DL (ref 9–20)
BUN/CREAT SERPL: 9
CHLORIDE SERPL-SCNC: 104 MMOL/L (ref 95–108)
CO2 SERPL-SCNC: 25 MMOL/L (ref 22–30)
CREAT SERPL-MCNC: 1.2 MG/DL (ref 0.7–1.3)
ERYTHROCYTE [DISTWIDTH] IN BLOOD BY AUTOMATED COUNT: 17.9 % (ref 11.5–15.5)
HCT VFR BLD AUTO: 28.4 % (ref 39–50)
HGB BLD-MCNC: 9.8 G/DL (ref 14–18)
MAGNESIUM SERPL-MCNC: 1.6 MG/DL (ref 1.6–2.3)
MCH RBC QN AUTO: 32.1 PG  CALC (ref 26–32)
MCHC RBC AUTO-ENTMCNC: 34.5 G/DL CAL (ref 32–36)
MCV RBC AUTO: 93.1 FL  CALC (ref 80–100)
PLATELET # BLD AUTO: 56 THOU/UL (ref 130–400)
POTASSIUM SERPL-SCNC: 3.5 MMOL/L (ref 3.5–5.1)
RBC # BLD AUTO: 3.05 MILL/UL (ref 4.7–6.1)
SODIUM SERPL-SCNC: 135 MMOL/L (ref 137–146)

## 2021-06-22 NOTE — NUR
PT note:
Patient continues to be profoundly weak. His Am Pac score is unchanged. He
worked on supine to sit and sit to stand transfers. His transitionalmovements
are very slow.
He is able to stand with Mod assist of 1 and FWW but does demonstrate
tremulous respnse to weight baering. He is able to perform some toe and and
heel raises and mini squats but is completely exhousted after about 5 reps
only. I encouraged him to independently perform supine to sit but to avoid
attempts at standing without assist at this time as he is a great fall risk.
Our plan is to continue gentle strengthening checking his pH level and
progressing functional activities accordingly

## 2021-06-22 NOTE — NUR
REPORT GIVEN BY MARYJANE. PATIENT RESTING IN BED. RESP EVEN AND UNLABORED. NO S/S
OF DISTRESS NOTED. ALLA SIMMONS PRECAUTIONS IN PLACE. IV SALINE LOCKED.
PLAN OF CARE DISCUSSED. PATIENT INFORMED TO CALL WITH ANY QUESTIONS OR
CONCERNS.

## 2021-06-22 NOTE — NUR
PT PROVIDED MILK OF MAGNESIA THIS MORNING AS PER NO BM FOR AT LEAST 3 DAYS,
CURRENTLY ASSISTED TO BSC WITH RESULTS.

## 2021-06-22 NOTE — NUR
PT WAS TAKEN TO ROOM 260 FOR A SHOWER.  PT DID WELL, BEARING HIS WEIGHT WITH
STANDBY ASSIST.  UPON RETURNING TO ROOM, PT SPOKE WITH  MAY
REGARDING PLAN OF CARE, WAS SEEN BY PHYSICAL THERAPIST MATTHEW, AND HAD A VISIT
FROM HIS FRIEND WILL.

## 2021-06-22 NOTE — NUR
PT SEEN AWAKE, ALERT, ORIENTED X 3.  LUNGS CLEAR, RA.  PT UPDATED ON IMPROVED
LABS, STATES THAT HE STILL FEELS WEAK.

## 2021-06-22 NOTE — NUR
LAB RESULTS HAVE RETURNED-IMPROVED FROM YESTERDAY. PT COOPERATIVE, BUT VERY
QUIET. HE ONLY ANSWERS/RESPONDS WHEN SPOKEN TO. ONLY TOOK SIPS OF FLUIDS WITH
MEDICATION. REMAINS IN BED. LIGHTS AND TV OFF.BOTH IV SITES ARE HL AT THIS
TIME

## 2021-06-23 VITALS — DIASTOLIC BLOOD PRESSURE: 77 MMHG | SYSTOLIC BLOOD PRESSURE: 104 MMHG

## 2021-06-23 VITALS — SYSTOLIC BLOOD PRESSURE: 120 MMHG | DIASTOLIC BLOOD PRESSURE: 86 MMHG

## 2021-06-23 VITALS — DIASTOLIC BLOOD PRESSURE: 76 MMHG | SYSTOLIC BLOOD PRESSURE: 118 MMHG

## 2021-06-23 VITALS — DIASTOLIC BLOOD PRESSURE: 82 MMHG | SYSTOLIC BLOOD PRESSURE: 119 MMHG

## 2021-06-23 VITALS — DIASTOLIC BLOOD PRESSURE: 79 MMHG | SYSTOLIC BLOOD PRESSURE: 111 MMHG

## 2021-06-23 VITALS — SYSTOLIC BLOOD PRESSURE: 109 MMHG | DIASTOLIC BLOOD PRESSURE: 74 MMHG

## 2021-06-23 VITALS — SYSTOLIC BLOOD PRESSURE: 92 MMHG | DIASTOLIC BLOOD PRESSURE: 52 MMHG

## 2021-06-23 VITALS — DIASTOLIC BLOOD PRESSURE: 81 MMHG | SYSTOLIC BLOOD PRESSURE: 138 MMHG

## 2021-06-23 VITALS — SYSTOLIC BLOOD PRESSURE: 111 MMHG | DIASTOLIC BLOOD PRESSURE: 76 MMHG

## 2021-06-23 VITALS — DIASTOLIC BLOOD PRESSURE: 78 MMHG | SYSTOLIC BLOOD PRESSURE: 124 MMHG

## 2021-06-23 VITALS — DIASTOLIC BLOOD PRESSURE: 70 MMHG | SYSTOLIC BLOOD PRESSURE: 105 MMHG

## 2021-06-23 VITALS — SYSTOLIC BLOOD PRESSURE: 113 MMHG | DIASTOLIC BLOOD PRESSURE: 85 MMHG

## 2021-06-23 LAB
ALBUMIN SERPL-MCNC: 2.7 G/DL (ref 3.2–5)
ALP SERPL-CCNC: 116 U/L (ref 38–126)
ANION GAP SERPL CALCULATED.3IONS-SCNC: 9 MMOL/L
AST SERPL-CCNC: 310 U/L (ref 17–59)
BUN SERPL-MCNC: 14 MG/DL (ref 9–20)
BUN/CREAT SERPL: 13
CHLORIDE SERPL-SCNC: 104 MMOL/L (ref 95–108)
CO2 SERPL-SCNC: 27 MMOL/L (ref 22–30)
CREAT SERPL-MCNC: 1.1 MG/DL (ref 0.7–1.3)
ERYTHROCYTE [DISTWIDTH] IN BLOOD BY AUTOMATED COUNT: 18.1 % (ref 11.5–15.5)
HCT VFR BLD AUTO: 27.2 % (ref 39–50)
HGB BLD-MCNC: 9.3 G/DL (ref 14–18)
MCH RBC QN AUTO: 32.7 PG  CALC (ref 26–32)
MCHC RBC AUTO-ENTMCNC: 34.2 G/DL CAL (ref 32–36)
MCV RBC AUTO: 95.8 FL  CALC (ref 80–100)
PLATELET # BLD AUTO: 83 THOU/UL (ref 130–400)
POTASSIUM SERPL-SCNC: 3.5 MMOL/L (ref 3.5–5.1)
PROT SERPL-MCNC: 5 G/DL (ref 6.3–8.2)
RBC # BLD AUTO: 2.84 MILL/UL (ref 4.7–6.1)
SODIUM SERPL-SCNC: 137 MMOL/L (ref 137–146)

## 2021-06-23 NOTE — NUR
PT SITTING ON THE SIDE OF THE BED. A&O X4. FLAT AFFECT NOTED. CLEAR BREATH
SOUNDS UPON AUSCULTATION. ACTIVE BOWEL SOUNDS X4 QUADRANTS. PT DENIES ANY N&V,
CIWA SCORE OF 0 GIVEN AT THIS TIME. IVS HEALTHY AND PATENT. CURRENTLY SR ON
MONITOR. PT REPORTS WEAKNESS. WALKER AT BEDSIDE, PHYSICAL THERAPY TO CONTINUE.
ASSESSMENT COMPLETED. DISCUSSED POC. CALL LIGHT WITHIN REACH.

## 2021-06-23 NOTE — NUR
PT note
Patient is seen for transfer training and closed chain strengthening. He was
able to stand with CGA of 1. He performed 2 sets of 5 mini squats and 2 sets
of 5 long stepping with FWW, He is able to march in place x 30 seconds before
muscle fatigue prevented further intervention
He is improving with his function and our plan is to contiue strengthening and
functional training with return to gait

## 2021-06-23 NOTE — NUR
REPORT GIVEN BY SCOTT. PATIENT RESTING IN BED. ALERT AND ORIENTED. PATIENT
STATES HE STILL FEELS WEAK. FALL AND SAFTEY PRECAUTIONS IN PLACE. USING URINAL
AT BEDSIDE. IV INFUSING BANANA BAG. RESP EVEN AND UNLABORED. NO S/S OF
DISTRESS NOTED. PLAN OF CARE DISCUSSED. PATIENT INFORMED TO CALL WITH ANY
QUESTIONS OR CONCERNS.

## 2021-06-23 NOTE — NUR
patient resting with eyes closed. resp even and unlabored. no s/s of distress
noted. fall and saftey precautions in place.

## 2021-06-24 VITALS — DIASTOLIC BLOOD PRESSURE: 73 MMHG | SYSTOLIC BLOOD PRESSURE: 113 MMHG

## 2021-06-24 VITALS — DIASTOLIC BLOOD PRESSURE: 58 MMHG | SYSTOLIC BLOOD PRESSURE: 121 MMHG

## 2021-06-24 VITALS — DIASTOLIC BLOOD PRESSURE: 58 MMHG | SYSTOLIC BLOOD PRESSURE: 88 MMHG

## 2021-06-24 VITALS — DIASTOLIC BLOOD PRESSURE: 77 MMHG | SYSTOLIC BLOOD PRESSURE: 123 MMHG

## 2021-06-24 VITALS — DIASTOLIC BLOOD PRESSURE: 70 MMHG | SYSTOLIC BLOOD PRESSURE: 105 MMHG

## 2021-06-24 VITALS — DIASTOLIC BLOOD PRESSURE: 54 MMHG | SYSTOLIC BLOOD PRESSURE: 92 MMHG

## 2021-06-24 VITALS — SYSTOLIC BLOOD PRESSURE: 116 MMHG | DIASTOLIC BLOOD PRESSURE: 78 MMHG

## 2021-06-24 VITALS — SYSTOLIC BLOOD PRESSURE: 113 MMHG | DIASTOLIC BLOOD PRESSURE: 75 MMHG

## 2021-06-24 VITALS — DIASTOLIC BLOOD PRESSURE: 86 MMHG | SYSTOLIC BLOOD PRESSURE: 133 MMHG

## 2021-06-24 VITALS — DIASTOLIC BLOOD PRESSURE: 79 MMHG | SYSTOLIC BLOOD PRESSURE: 132 MMHG

## 2021-06-24 VITALS — DIASTOLIC BLOOD PRESSURE: 81 MMHG | SYSTOLIC BLOOD PRESSURE: 111 MMHG

## 2021-06-24 VITALS — DIASTOLIC BLOOD PRESSURE: 83 MMHG | SYSTOLIC BLOOD PRESSURE: 121 MMHG

## 2021-06-24 VITALS — DIASTOLIC BLOOD PRESSURE: 81 MMHG | SYSTOLIC BLOOD PRESSURE: 120 MMHG

## 2021-06-24 LAB
ALBUMIN SERPL-MCNC: 2.7 G/DL (ref 3.2–5)
ALP SERPL-CCNC: 119 U/L (ref 38–126)
ANION GAP SERPL CALCULATED.3IONS-SCNC: 9 MMOL/L
AST SERPL-CCNC: 220 U/L (ref 17–59)
BUN SERPL-MCNC: 14 MG/DL (ref 9–20)
BUN/CREAT SERPL: 16
CHLORIDE SERPL-SCNC: 104 MMOL/L (ref 95–108)
CO2 SERPL-SCNC: 27 MMOL/L (ref 22–30)
CREAT SERPL-MCNC: 0.9 MG/DL (ref 0.7–1.3)
ERYTHROCYTE [DISTWIDTH] IN BLOOD BY AUTOMATED COUNT: 17.6 % (ref 11.5–15.5)
HCT VFR BLD AUTO: 25.7 % (ref 39–50)
HGB BLD-MCNC: 8.8 G/DL (ref 14–18)
MCH RBC QN AUTO: 32.6 PG  CALC (ref 26–32)
MCHC RBC AUTO-ENTMCNC: 34.2 G/DL CAL (ref 32–36)
MCV RBC AUTO: 95.2 FL  CALC (ref 80–100)
PLATELET # BLD AUTO: 115 THOU/UL (ref 130–400)
POTASSIUM SERPL-SCNC: 3.4 MMOL/L (ref 3.5–5.1)
PROT SERPL-MCNC: 4.9 G/DL (ref 6.3–8.2)
RBC # BLD AUTO: 2.7 MILL/UL (ref 4.7–6.1)
SODIUM SERPL-SCNC: 137 MMOL/L (ref 137–146)

## 2021-06-24 NOTE — NUR
PT BP REASSESSED AT 92/54. PT ASKING FOR HIS HEAD TO BE ELEVATED BACK
UP/PROVIDED. WILL RECHECK BP IN 30 MINUTES FOR CHANGE TO MAKE SURE HE IS
MAINTAINING. RECORD OF PT DROPPING LOW AT THIS TIME PREVIOUS DAY. WILL
MONITOR.

## 2021-06-24 NOTE — NUR
pt agreed to participate in physical therapy. He was able to transition from
supine to sitting on EOB with min assist. Mod assist to stand at RW where he
then completed mini squats x10, heel raises x10, marching in place x10, hip
abduction x10. After a seated rest break he completed another round of the
same exercises for a total of 2x10 for each exercise. CGA with gait belt
throughout weight bearing activities. Pt was left comfortable in bed with call
light in reach. Ampac=9

## 2021-06-24 NOTE — NUR
PT V/S ASSESSED 105/70,HR67 PT WAS SLEPEING, AWOKE TO MY VOICE. NO S/O
DISTRESS NOTED. PT MEDICATED AS ORDERS PROVIDE.

## 2021-06-24 NOTE — NUR
"I LIKE IT DARK."
PT. INSTRUCTED IN SAFE TRANSFER FROM SIT <-> STAND AND ENCOURAGED PATIENT TO
PERFORM SIMPLE ADLS OF GROOMING.  PATIENT WASHED FACE AND HANDS WITH WASH
CLOTH, COMBED HAIR I'LY, BRUSHED TEETH I'LY SITTING AT EDGE OF BED, THEN
NEEDED MIN A WITH SIT <-> STAND TRANSFER THEN USED R.W. TO CHAIR SEVERAL STEPS
WITH CG.  PATIENT INSTRUCTED IN STANDING WHILE PERFORMING UE THER EX WITH
DIAPHRAGMATIC BREATHING X 8 MIN WITH CG.  PATIENT REPORTED NEEDING TO SIT DOWN
AND NEEDED CG FOR SAFETY.  O.T. LEFT CALL LIGHT WITH PATIENT SITTING IN CHAIR.
PATIENT NEEDED ENCOURAGEMENT.
CONTINUE TO PROGRESS WITH P.O.C.

## 2021-06-24 NOTE — NUR
AIDE REPORTED THAT PT'S BP IS 88/58. PT WAS PLACED IN [place]. LOC X3.
DENIES ANY DISTRESS. NO TREMORS VISIBLE AT THIS TIME AND PT DENIES ANY
AUDITORY OR VISUAL DISTURBANCES.

## 2021-06-24 NOTE — NUR
PT TRANSFERRED TO MED SURG FLOOR FROM ICU VIA WC IN STABLE CONDITION
ACCOMPANIED BY NURSE CHAVEZ;PT ORIENTED TO NEW ROOM AND SURROUNDINGS;SAFETY
PRECAUTIONS IN PLACE;CALL LIGHT WITHIN REACH;PT ENCOURAGED TO CALL WITH ANY
ISSUES OR CONCERNS;WILL CONTINUE TO MONITOR;

## 2021-06-25 VITALS — DIASTOLIC BLOOD PRESSURE: 66 MMHG | SYSTOLIC BLOOD PRESSURE: 97 MMHG

## 2021-06-25 VITALS — SYSTOLIC BLOOD PRESSURE: 104 MMHG | DIASTOLIC BLOOD PRESSURE: 65 MMHG

## 2021-06-25 VITALS — DIASTOLIC BLOOD PRESSURE: 76 MMHG | SYSTOLIC BLOOD PRESSURE: 111 MMHG

## 2021-06-25 VITALS — DIASTOLIC BLOOD PRESSURE: 56 MMHG | SYSTOLIC BLOOD PRESSURE: 94 MMHG

## 2021-06-25 LAB
ANION GAP SERPL CALCULATED.3IONS-SCNC: 9 MMOL/L
BUN SERPL-MCNC: 13 MG/DL (ref 9–20)
BUN/CREAT SERPL: 14
CHLORIDE SERPL-SCNC: 102 MMOL/L (ref 95–108)
CO2 SERPL-SCNC: 28 MMOL/L (ref 22–30)
CREAT SERPL-MCNC: 1 MG/DL (ref 0.7–1.3)
ERYTHROCYTE [DISTWIDTH] IN BLOOD BY AUTOMATED COUNT: 17.6 % (ref 11.5–15.5)
HCT VFR BLD AUTO: 26.8 % (ref 39–50)
HGB BLD-MCNC: 9.1 G/DL (ref 14–18)
MAGNESIUM SERPL-MCNC: 0.9 MG/DL (ref 1.6–2.3)
MCH RBC QN AUTO: 32.6 PG  CALC (ref 26–32)
MCHC RBC AUTO-ENTMCNC: 34 G/DL CAL (ref 32–36)
MCV RBC AUTO: 96.1 FL  CALC (ref 80–100)
PLATELET # BLD AUTO: 160 THOU/UL (ref 130–400)
POTASSIUM SERPL-SCNC: 3.9 MMOL/L (ref 3.5–5.1)
RBC # BLD AUTO: 2.79 MILL/UL (ref 4.7–6.1)
SODIUM SERPL-SCNC: 135 MMOL/L (ref 137–146)

## 2021-06-25 NOTE — NUR
PT MEDICATED W/LIBRIUM. HE WAS SLEEPING, AWOKE TO MY VOICE. NO S/O DISTRESSES
NOTED. HE DOES HAVE MILD TREMORS VISIBLE WHILE HANDLIN HIS PILL AND CUP OF
WATER. DENIES ANY OTHER DISTRESS.

## 2021-06-25 NOTE — NUR
PT WAS FOUND RESTING IN BEDSIDE CHAIR EATING LUNCH;CIWA SCORE WAS NOTED AS
1;PT IS CALM AND COOPERATIVE AT THIS TIME;PT ENCOURAGED TO AMBULATE
MORE;PT REQUESTED A WALKER;WALKER WAS OBTAINED FROM PHYSICAL THERAPY AND
PLACED IN ROOM;SAFETY PRECAUTIONS IN PLACE;CALL LIGHT WITHIN REACH;WILL
CONTINUE TO MONITOR.

## 2021-06-25 NOTE — NUR
PT WAS FOUND RESTING IN BED WITH VISITOR PRESENT;CIWA SCORE IS STILL AT 1;#20G
IV IN LFA AND #22G IV IN RFA ARE SL, PATENT AND APPEAR
FREE OF COMPLICATIONS AT THIS TIME;SAFETY PRECAUTIONS IN PLACE;CALL LIGHT
WITHIN REACH;WILL CONTINUE TO MONITOR.

## 2021-06-25 NOTE — NUR
PATIENT RESTING IN BED WITH EYES CLOSED. DENIES PAIN. ASSESSMENT COMPLETE AND
CHARTED. NO ACUTE DISTRESS NOTED. BED IN LOW POSITION. CALL LIGHT WITHIN
REACH.

## 2021-06-25 NOTE — NUR
Physical Therapy Note:
 
Patient seend and treated today. Patient identified by full name and date of
birth.
 
Physical Therapy Management:
1. Supine to sit and sit to supine.
2. Sitting tolerance of 10-15 minutes
3. Sit to stand
4. Active range of motion of the UE x 5-10 repetitions
5. Active range of motion of the LE x 5-10 repetitions
6. Gait training with + 1 minimal assist x 20-30 feet.
 
Rest periods given in between exercises. Patient fatigued quickly during gait
training but was able to recover after a rest period. Patient went back to bed
after physical therapy session. Call button within reach.

## 2021-06-25 NOTE — NUR
PT WAS FOUND RESTING IN BEDSIDE CHAIR;PT IS A&OX3;VS AND ASSESSMENT WERE
COMPLETED;PT HAS A CIWA SCORE OF 4 DUE TO SLIGHT TREMORS NOTED IN HANDS;HEART
SOUNDS ARE REGULAR IN RATE AND RHYTHM;LUNG SOUNDS ARE CLEAR;RESPIRATIONS ARE
EVEN AND UNLABORED ON RA;#20G IV IN LFA IS SL, PATENT AND APPEARS FREE OF
COMPLICATIONS AT THIS TIME;#22G IV IN RFA IS SL, PATENT AND APPEARS FREE OF
COMPLICATIONS AT THIS TIME;SAFETY PRECAUTIONS IN PLACE;CALL LIGHT WITHIN
REACH;PT ENCOURAGED TO CALL WITH ANY NEEDS OR CONCERNS;WILL CONTINUE TO
MONITOR.

## 2021-06-25 NOTE — NUR
"I'M MAD BECAUSE I LOST MY CALL."
PATIENT AGREED TO GETTING UP OUT OF BED, HOWEVER, DECLINED ADLS.  PATIENT
PERFORMED SUPINE -> SIT WITH S, SIT <-> STAND WITH S.  O.T. INSTRUCTED PATIENT
IN STANDING DIAPHRAGMATIC BREATHING AND UE AROM EXERCISES AND DISCUSSED COPING
SKILLS TO DECREASE HIS STRESS AND PATIENT VERBALIZED UNDERSTANDING.  O.T.
INSTRUCTED PATIENT IN DYNAMIC STANDING BALANCE EXERCISES REACHING IN FRONT AND
SIDE TO SIDE AND PATIENT PERFORMED EACH X 3 S L.O.B.  PT. SAT IN CHAIR WITH S
AND SET UP WITH CALL LIGHT AND TABLE.

## 2021-06-26 VITALS — DIASTOLIC BLOOD PRESSURE: 51 MMHG | SYSTOLIC BLOOD PRESSURE: 86 MMHG

## 2021-06-26 VITALS — SYSTOLIC BLOOD PRESSURE: 94 MMHG | DIASTOLIC BLOOD PRESSURE: 62 MMHG

## 2021-06-26 VITALS — SYSTOLIC BLOOD PRESSURE: 103 MMHG | DIASTOLIC BLOOD PRESSURE: 68 MMHG

## 2021-06-26 VITALS — DIASTOLIC BLOOD PRESSURE: 57 MMHG | SYSTOLIC BLOOD PRESSURE: 94 MMHG

## 2021-06-26 VITALS — DIASTOLIC BLOOD PRESSURE: 67 MMHG | SYSTOLIC BLOOD PRESSURE: 103 MMHG

## 2021-06-26 LAB
ANION GAP SERPL CALCULATED.3IONS-SCNC: 8 MMOL/L
BUN SERPL-MCNC: 14 MG/DL (ref 9–20)
BUN/CREAT SERPL: 14
CHLORIDE SERPL-SCNC: 103 MMOL/L (ref 95–108)
CO2 SERPL-SCNC: 28 MMOL/L (ref 22–30)
CREAT SERPL-MCNC: 1 MG/DL (ref 0.7–1.3)
ERYTHROCYTE [DISTWIDTH] IN BLOOD BY AUTOMATED COUNT: 17.7 % (ref 11.5–15.5)
HCT VFR BLD AUTO: 26.8 % (ref 39–50)
HGB BLD-MCNC: 8.7 G/DL (ref 14–18)
MAGNESIUM SERPL-MCNC: 1.5 MG/DL (ref 1.6–2.3)
MCH RBC QN AUTO: 32 PG  CALC (ref 26–32)
MCHC RBC AUTO-ENTMCNC: 32.5 G/DL CAL (ref 32–36)
MCV RBC AUTO: 98.5 FL  CALC (ref 80–100)
PLATELET # BLD AUTO: 195 THOU/UL (ref 130–400)
POTASSIUM SERPL-SCNC: 4 MMOL/L (ref 3.5–5.1)
RBC # BLD AUTO: 2.72 MILL/UL (ref 4.7–6.1)
SODIUM SERPL-SCNC: 135 MMOL/L (ref 137–146)

## 2021-06-26 NOTE — NUR
PATIENT IS SITTING IN RECLINER. ASSESSMENT DONE. PATIENT IS ALERT AND ORIENT
X3. PATIENT DENIES PAIN. RESPS EVEN AND UNLABORED. CIWA IS ONE. PATIENT DENIES
NEEDS AT THIS TIME. CALL LIGHT IN REACH.

## 2021-06-26 NOTE — NUR
PATIENT AWAKE IN BED. PLEASANT. CALM. DENIES PAIN. ASSESSMENT COMPLETE. NO
ACUTE DISTRESS. CIWA 1. BED IN LOW POSITION. CALL LIGHT WITHIN REACH.

## 2021-06-26 NOTE — NUR
PATIENT IS SITTING IN THE CHAIR WITH NO DISTRESS NOTED. PATIENT DENIES PAIN OR
NEEDS AT THIS TIME. CALL LIGHT IN REACH.

## 2021-06-26 NOTE — NUR
Patient did B LE AROM exercises in seated position:  hip flexion, hip
adduction and abduction, hamstring curls, knee extension, ankle AROM, and
gluteal squeezes for 10 reps x 3 sets with occasional verbal and tactile cuing
to decrease trick movements and fall risks.  Patient also did log rolling bed
mobility and sit to stand push off transfers from bed for 3 reps  with
constant verbal and tactile cuing to help decrease trick movement and fall
risks.  Patient did gait training with use of RW covering 10 feet x 3 reps
with occasional lateral swaying with CGA to SBA x 1 on level surfaces.

## 2021-06-26 NOTE — NUR
PATIENT IS ALERT AND AWAKE. CALM. Irish SPEAKING. RESPIRATIONS EASY ON ROOM
AIR. ON TELEMETRY. HEART RATE REGULAR. LEFT ANKLE SPLINTED DUE TO FRACTURE.
C/O PAIN 10. UP TO BEDSIDE COMMODE WITH MAX ASSIST. MEDICATIN COMPLIANT. LEGS
ELEVATED. BED IN LOW POSITION. CALL LIGHT WITHIN REACH. BED ALARM ACTIVATED.

## 2021-06-26 NOTE — NUR
PATIENT IS RESTING IN BED WITH NO DISTRESS NOTED. PATIENT DENIES NEEDS AT THIS
TIME. CALL LIGHT IN REACH.

## 2021-06-27 VITALS — DIASTOLIC BLOOD PRESSURE: 64 MMHG | SYSTOLIC BLOOD PRESSURE: 98 MMHG

## 2021-06-27 VITALS — SYSTOLIC BLOOD PRESSURE: 105 MMHG | DIASTOLIC BLOOD PRESSURE: 69 MMHG

## 2021-06-27 VITALS — DIASTOLIC BLOOD PRESSURE: 68 MMHG | SYSTOLIC BLOOD PRESSURE: 108 MMHG

## 2021-06-27 VITALS — SYSTOLIC BLOOD PRESSURE: 127 MMHG | DIASTOLIC BLOOD PRESSURE: 81 MMHG

## 2021-06-27 LAB
ANION GAP SERPL CALCULATED.3IONS-SCNC: 9 MMOL/L
BUN SERPL-MCNC: 16 MG/DL (ref 9–20)
BUN/CREAT SERPL: 16
CHLORIDE SERPL-SCNC: 104 MMOL/L (ref 95–108)
CO2 SERPL-SCNC: 29 MMOL/L (ref 22–30)
CREAT SERPL-MCNC: 1 MG/DL (ref 0.7–1.3)
MAGNESIUM SERPL-MCNC: 1.6 MG/DL (ref 1.6–2.3)
POTASSIUM SERPL-SCNC: 4 MMOL/L (ref 3.5–5.1)
SODIUM SERPL-SCNC: 138 MMOL/L (ref 137–146)

## 2021-06-27 NOTE — NUR
PATIENT LAYING IN BED. ALERT AND ORIENTED. C/O PAIN 2/10 TO BILATERAL LEGS.
REFUSED TYLENOL. ASSESSMENT COMPLETE. CIWA SCORE 0 AT THIS TIME. BED IN LOW
POSITION. CALL LIGHT WITHIN REACH.

## 2021-06-27 NOTE — NUR
PT SITTING IN CHAIR. A&O X4. FLAT AFFECT NOTED. NO PAIN REPORTED AT THIS TIME.
CLEAR BREATH SOUNDS UPON AUSCULTATION. ACTIVE BOWEL SOUNDS X4 QUADRANTS. IV
HEALTHY AND PATENT. PT VERBALIZES IMPROVEMENT REGARDING GAIT AND STRENGTH.
WALKER AT BEDSIDE. CIWA SCORE OF 1 GIVEN. ASSESSMENT COMPLETED. DISCUSSED POC.
CALL LIGHT WITHIN REACH.

## 2021-06-28 VITALS — DIASTOLIC BLOOD PRESSURE: 62 MMHG | SYSTOLIC BLOOD PRESSURE: 100 MMHG

## 2021-06-28 VITALS — SYSTOLIC BLOOD PRESSURE: 118 MMHG | DIASTOLIC BLOOD PRESSURE: 68 MMHG

## 2021-06-28 VITALS — DIASTOLIC BLOOD PRESSURE: 63 MMHG | SYSTOLIC BLOOD PRESSURE: 105 MMHG

## 2021-06-28 VITALS — DIASTOLIC BLOOD PRESSURE: 66 MMHG | SYSTOLIC BLOOD PRESSURE: 101 MMHG

## 2021-06-28 LAB
ANION GAP SERPL CALCULATED.3IONS-SCNC: 10 MMOL/L
BUN SERPL-MCNC: 18 MG/DL (ref 9–20)
BUN/CREAT SERPL: 20
CHLORIDE SERPL-SCNC: 105 MMOL/L (ref 95–108)
CO2 SERPL-SCNC: 27 MMOL/L (ref 22–30)
CREAT SERPL-MCNC: 0.9 MG/DL (ref 0.7–1.3)
ERYTHROCYTE [DISTWIDTH] IN BLOOD BY AUTOMATED COUNT: 17.5 % (ref 11.5–15.5)
HCT VFR BLD AUTO: 27 % (ref 39–50)
HGB BLD-MCNC: 8.7 G/DL (ref 14–18)
MAGNESIUM SERPL-MCNC: 1.3 MG/DL (ref 1.6–2.3)
MCH RBC QN AUTO: 32.2 PG  CALC (ref 26–32)
MCHC RBC AUTO-ENTMCNC: 32.2 G/DL CAL (ref 32–36)
MCV RBC AUTO: 100 FL  CALC (ref 80–100)
PLATELET # BLD AUTO: 248 THOU/UL (ref 130–400)
POTASSIUM SERPL-SCNC: 3.9 MMOL/L (ref 3.5–5.1)
RBC # BLD AUTO: 2.7 MILL/UL (ref 4.7–6.1)
SODIUM SERPL-SCNC: 138 MMOL/L (ref 137–146)

## 2021-06-28 NOTE — NUR
ASSESSMENT IS COMPLETED: IV SITE IS FREE FROM REDNESS OR EDEMA. HR IS
REG,PULSES ARE STRONG X4, ABD IS SOFT WITH ACTIVE BS. BREATH SOUNDS ARE CLEAR,
BILATERALLY. PT HAS BEEN SITTING IN THE CHAIR,CIWA IS ZERO. CONTINUE TO
OBSERVE AND MONITOR.

## 2021-06-28 NOTE — NUR
"I'M HERE."
PT. RECEIVED SITTING ON EDGE OF BED IN DARK ROOM.  PT. REPORTED BEEN SLEEPING
AND EATING WELL.  PT. INSTRUCTED IN SAFE SIT <-> STAND TRANSFERS AND PATIENT
PERFORMED WITH S.  PT. INSTRUCTED IN STANDING UE THER EX X 10 MIN USING
DIAPHRAGMATIC BREATHING AND PATIENT WAS ABLE TO COMPLETE 10 MIN IN STANDING S
L.O.B.  PATIENT REPORTED HIS STRENGTH COMES AND GOES.  PATIENT IS COOPERATIVE
AND MOTIVATED TO IMPROVE, HOWEVER, HAS A FLAT AFFECT.  PATIENT REPORTED BEEN
GOING TO THE BATHROOM HIMSELF NOW AND DOING BETTER AND LOOKING FORWARD TO
GOING TO PHYSICAL REHAB FIRST TO IMPROVE STRENGTH.  AM PAC SCORE OF 16
INDICATES D/C TO REHAB/IRF.

## 2021-06-28 NOTE — NUR
PT IS SITTING UP AND INQUIRIN G IF WE HAD SOMETHING TO READ. GAVE A BOOK TO
GIVE HIM SOMEHTING TO READ.

## 2021-06-29 VITALS — DIASTOLIC BLOOD PRESSURE: 66 MMHG | SYSTOLIC BLOOD PRESSURE: 118 MMHG

## 2021-06-29 VITALS — SYSTOLIC BLOOD PRESSURE: 118 MMHG | DIASTOLIC BLOOD PRESSURE: 66 MMHG

## 2021-06-29 VITALS — DIASTOLIC BLOOD PRESSURE: 59 MMHG | SYSTOLIC BLOOD PRESSURE: 108 MMHG

## 2021-06-29 LAB
ANION GAP SERPL CALCULATED.3IONS-SCNC: 10 MMOL/L
BUN SERPL-MCNC: 17 MG/DL (ref 9–20)
BUN/CREAT SERPL: 16
CHLORIDE SERPL-SCNC: 105 MMOL/L (ref 95–108)
CO2 SERPL-SCNC: 27 MMOL/L (ref 22–30)
CREAT SERPL-MCNC: 1.1 MG/DL (ref 0.7–1.3)
MAGNESIUM SERPL-MCNC: 1.5 MG/DL (ref 1.6–2.3)
POTASSIUM SERPL-SCNC: 4 MMOL/L (ref 3.5–5.1)
SODIUM SERPL-SCNC: 138 MMOL/L (ref 137–146)

## 2021-06-29 NOTE — NUR
CALLED AND GAVE REPORT TO TUSHAR CHAVARRIA AT [facility]. [facility] ALREADY LEFT
WITH PT AT 1206 VIA STRETCHER

## 2021-06-29 NOTE — NUR
"I FEEL PRETTY GOOD."
PT. INSTRUCTED IN GETTING OUT OF BED AND PATIENT PERFORMED WITH S.  PT.
PERFORMED SIT -> STAND WITH S AND PATIENT USED NO A.D. TO BATHROOM WITH CGA
FROM O.T.  PT. STOOD X 10 MIN AND PERFORMED GROOMING I'LY AT SINK WASHING
FACE, COMBING HAIR, BRUSHING TEETH.  PT. PERFORMED F.M. S A.D. BACK TO ROOM
AND SAT IN CHAIR I'LY AND LEFT WITH CALL LIGHT AT SIDE.
PT. MADE EXCELLENT PROGRESS AND HAS IMPROVED MOTIVATION.
PT. BEING D/C'D TO REHAB TODAY.

## 2021-06-29 NOTE — NUR
ASSESSMENT IS COMPLETED: IV SITE IS FREE FROM REDNESS OR EDEMA. HR IS
REG,PULSES ARE STRONG X4, ABD IS SOFT WITH ACTIVE BS. BREATH SOUNDS ARE
CELAR,BILATERALLY. CONTINUE TO OSBERVE AND MONITOR.

## 2021-06-29 NOTE — NUR
PT IS RELAXING IN THE [facility] AND [facility] HERE TO TRANSPORT PT TO [facility].

## 2021-06-29 NOTE — NUR
Pt received from dayshift RN pt in bed stable alert and oriented Pt very quiet
with no complaints meds given and tolerated. assesment done by RN. Pt remain
in bed resting.

## 2021-10-30 ENCOUNTER — HOSPITAL ENCOUNTER (EMERGENCY)
Dept: HOSPITAL 82 - ED | Age: 41
Discharge: ANOTHER HOSPITAL | DRG: 378 | End: 2021-10-30
Payer: COMMERCIAL

## 2021-10-30 VITALS — HEIGHT: 65 IN | WEIGHT: 101.41 LBS | BODY MASS INDEX: 16.9 KG/M2

## 2021-10-30 VITALS — DIASTOLIC BLOOD PRESSURE: 63 MMHG | SYSTOLIC BLOOD PRESSURE: 111 MMHG
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
## 2021-10-30 DIAGNOSIS — K92.2: Primary | ICD-10-CM

## 2021-10-30 DIAGNOSIS — Z87.11: ICD-10-CM

## 2021-10-30 DIAGNOSIS — I10: ICD-10-CM

## 2021-10-30 DIAGNOSIS — F17.200: ICD-10-CM

## 2021-10-30 DIAGNOSIS — S80.02XA: ICD-10-CM

## 2021-10-30 DIAGNOSIS — K21.9: ICD-10-CM

## 2021-10-30 DIAGNOSIS — F10.10: ICD-10-CM

## 2021-10-30 DIAGNOSIS — S20.224A: ICD-10-CM

## 2021-10-30 DIAGNOSIS — J44.9: ICD-10-CM
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
## 2021-10-30 DIAGNOSIS — D61.818: ICD-10-CM

## 2021-10-30 DIAGNOSIS — S80.01XA: ICD-10-CM

## 2021-10-30 DIAGNOSIS — Z20.822: ICD-10-CM

## 2021-10-30 DIAGNOSIS — K70.10: ICD-10-CM

## 2021-10-30 DIAGNOSIS — J98.2: ICD-10-CM

## 2021-10-30 LAB
ALBUMIN SERPL-MCNC: 4.1 G/DL (ref 3.2–5)
ALP SERPL-CCNC: 180 U/L (ref 38–126)
AMYLASE SERPL-CCNC: 49 U/L (ref 30–110)
ANION GAP SERPL CALCULATED.3IONS-SCNC: 18 MMOL/L
APTT PPP: 14.9 SECONDS (ref 20–32.5)
AST SERPL-CCNC: 290 U/L (ref 17–59)
BASOPHILS NFR BLD AUTO: 0 % (ref 0–3)
BILIRUB UR QL STRIP.AUTO: (no result)
BUN SERPL-MCNC: 34 MG/DL (ref 9–20)
BUN/CREAT SERPL: 43
CHLORIDE SERPL-SCNC: 91 MMOL/L (ref 95–108)
CK SERPL-CCNC: 211 U/L (ref 52–200)
CO2 SERPL-SCNC: 30 MMOL/L (ref 22–30)
COLOR UR AUTO: (no result)
CREAT SERPL-MCNC: 0.8 MG/DL (ref 0.7–1.3)
EOSINOPHIL NFR BLD AUTO: 0 % (ref 0–8)
ERYTHROCYTE [DISTWIDTH] IN BLOOD BY AUTOMATED COUNT: 15.6 % (ref 11.5–15.5)
ETHANOL SERPL-MCNC: 0 MG/DL (ref 0–30)
GLUCOSE UR STRIP.AUTO-MCNC: NEGATIVE MG/DL
HCT VFR BLD AUTO: 25.2 % (ref 39–50)
HGB BLD-MCNC: 8.5 G/DL (ref 14–18)
HGB UR QL STRIP.AUTO: NEGATIVE
IMM GRANULOCYTES NFR BLD: 0.3 % (ref 0–5)
INR PPP: 1.1 RATIO (ref 0.7–1.3)
KETONES UR STRIP.AUTO-MCNC: 40 MG/DL
LEUKOCYTE ESTERASE UR QL STRIP.AUTO: NEGATIVE
LIPASE SERPL-CCNC: 258 U/L (ref 23–300)
LYMPHOCYTES NFR BLD: 23 % (ref 15–41)
MAGNESIUM SERPL-MCNC: 1.8 MG/DL (ref 1.6–2.3)
MCH RBC QN AUTO: 29.9 PG  CALC (ref 26–32)
MCHC RBC AUTO-ENTMCNC: 33.7 G/DL CAL (ref 32–36)
MCV RBC AUTO: 88.7 FL  CALC (ref 80–100)
MONOCYTES NFR BLD AUTO: 5 % (ref 2–13)
NEUTROPHILS # BLD AUTO: 2.72 THOU/UL (ref 1.82–7.42)
NEUTROPHILS NFR BLD AUTO: 72 % (ref 42–76)
NITRITE UR QL STRIP.AUTO: NEGATIVE
PH UR STRIP.AUTO: 6.5 [PH] (ref 4.5–8)
PLATELET # BLD AUTO: 70 THOU/UL (ref 130–400)
POTASSIUM SERPL-SCNC: 3.3 MMOL/L (ref 3.5–5.1)
PROT SERPL-MCNC: 6.7 G/DL (ref 6.3–8.2)
PROT UR QL STRIP.AUTO: (no result) MG/DL
PROTHROMBIN TIME: 11.1 SECONDS (ref 9–12.5)
RBC # BLD AUTO: 2.84 MILL/UL (ref 4.7–6.1)
SODIUM SERPL-SCNC: 136 MMOL/L (ref 137–146)
SP GR UR STRIP.AUTO: 1.01
UROBILINOGEN UR QL STRIP.AUTO: 1 E.U./DL

## 2021-10-30 PROCEDURE — S0164 INJECTION PANTROPRAZOLE: HCPCS

## 2021-10-30 PROCEDURE — P9016 RBC LEUKOCYTES REDUCED: HCPCS

## 2021-10-30 PROCEDURE — 30233N1 TRANSFUSION OF NONAUTOLOGOUS RED BLOOD CELLS INTO PERIPHERAL VEIN, PERCUTANEOUS APPROACH: ICD-10-PCS | Performed by: EMERGENCY MEDICINE

## 2021-11-29 ENCOUNTER — HOSPITAL ENCOUNTER (EMERGENCY)
Dept: HOSPITAL 82 - ED | Age: 41
LOS: 1 days | Discharge: HOME | DRG: 810 | End: 2021-11-30
Payer: COMMERCIAL

## 2021-11-29 VITALS — WEIGHT: 143.3 LBS | BODY MASS INDEX: 23.88 KG/M2 | HEIGHT: 65 IN

## 2021-11-29 VITALS — DIASTOLIC BLOOD PRESSURE: 67 MMHG | SYSTOLIC BLOOD PRESSURE: 120 MMHG

## 2021-11-29 DIAGNOSIS — F17.200: ICD-10-CM

## 2021-11-29 DIAGNOSIS — Z87.11: ICD-10-CM

## 2021-11-29 DIAGNOSIS — D61.818: Primary | ICD-10-CM

## 2021-11-29 DIAGNOSIS — I10: ICD-10-CM

## 2021-11-29 DIAGNOSIS — J44.9: ICD-10-CM

## 2021-11-29 DIAGNOSIS — K21.9: ICD-10-CM

## 2021-11-29 DIAGNOSIS — F10.10: ICD-10-CM

## 2021-11-29 LAB
ALBUMIN SERPL-MCNC: 4.1 G/DL (ref 3.2–5)
ALP SERPL-CCNC: 153 U/L (ref 38–126)
AMORPH SED URNS QL MICRO: (no result) HPF
ANION GAP SERPL CALCULATED.3IONS-SCNC: 21 MMOL/L
APTT PPP: 20.2 SECONDS (ref 20–32.5)
AST SERPL-CCNC: 120 U/L (ref 17–59)
BASOPHILS NFR BLD AUTO: 1 % (ref 0–3)
BILIRUB UR QL STRIP.AUTO: NEGATIVE
BUN SERPL-MCNC: 25 MG/DL (ref 9–20)
BUN/CREAT SERPL: 43
CHLORIDE SERPL-SCNC: 93 MMOL/L (ref 95–108)
CO2 SERPL-SCNC: 19 MMOL/L (ref 22–30)
COLOR UR AUTO: YELLOW
CREAT SERPL-MCNC: 0.6 MG/DL (ref 0.7–1.3)
EOSINOPHIL NFR BLD AUTO: 0 % (ref 0–8)
ERYTHROCYTE [DISTWIDTH] IN BLOOD BY AUTOMATED COUNT: 17.6 % (ref 11.5–15.5)
GLUCOSE UR STRIP.AUTO-MCNC: NEGATIVE MG/DL
HCT VFR BLD AUTO: 23 % (ref 39–50)
HGB BLD-MCNC: 8.1 G/DL (ref 14–18)
HGB UR QL STRIP.AUTO: NEGATIVE
IMM GRANULOCYTES NFR BLD: 0.9 % (ref 0–5)
INR PPP: 1 RATIO (ref 0.7–1.3)
KETONES UR STRIP.AUTO-MCNC: 40 MG/DL
LEUKOCYTE ESTERASE UR QL STRIP.AUTO: NEGATIVE
LIPASE SERPL-CCNC: 32 U/L (ref 23–300)
LYMPHOCYTES NFR BLD: 22 % (ref 15–41)
MCH RBC QN AUTO: 31.6 PG  CALC (ref 26–32)
MCHC RBC AUTO-ENTMCNC: 35.2 G/DL CAL (ref 32–36)
MCV RBC AUTO: 89.8 FL  CALC (ref 80–100)
MONOCYTES NFR BLD AUTO: 7 % (ref 2–13)
NEUTROPHILS # BLD AUTO: 2.21 THOU/UL (ref 1.82–7.42)
NEUTROPHILS NFR BLD AUTO: 70 % (ref 42–76)
NITRITE UR QL STRIP.AUTO: NEGATIVE
PH UR STRIP.AUTO: 6.5 [PH] (ref 4.5–8)
PLATELET # BLD AUTO: 210 THOU/UL (ref 130–400)
POTASSIUM SERPL-SCNC: 3 MMOL/L (ref 3.5–5.1)
PROT SERPL-MCNC: 7.1 G/DL (ref 6.3–8.2)
PROT UR QL STRIP.AUTO: 30 MG/DL
PROTHROMBIN TIME: 10.2 SECONDS (ref 9–12.5)
RBC # BLD AUTO: 2.56 MILL/UL (ref 4.7–6.1)
RBC #/AREA URNS HPF: (no result) RBC/HPF (ref 0–5)
SODIUM SERPL-SCNC: 130 MMOL/L (ref 137–146)
SP GR UR STRIP.AUTO: 1.01
UROBILINOGEN UR QL STRIP.AUTO: 4 E.U./DL
WBC #/AREA URNS HPF: (no result) WBC/HPF (ref 0–5)
